# Patient Record
Sex: FEMALE | Race: WHITE | NOT HISPANIC OR LATINO
[De-identification: names, ages, dates, MRNs, and addresses within clinical notes are randomized per-mention and may not be internally consistent; named-entity substitution may affect disease eponyms.]

---

## 2017-06-16 ENCOUNTER — APPOINTMENT (OUTPATIENT)
Dept: MAMMOGRAPHY | Facility: IMAGING CENTER | Age: 67
End: 2017-06-16

## 2017-06-20 ENCOUNTER — APPOINTMENT (OUTPATIENT)
Dept: MAMMOGRAPHY | Facility: IMAGING CENTER | Age: 67
End: 2017-06-20

## 2017-06-20 ENCOUNTER — OUTPATIENT (OUTPATIENT)
Dept: OUTPATIENT SERVICES | Facility: HOSPITAL | Age: 67
LOS: 1 days | End: 2017-06-20
Payer: COMMERCIAL

## 2017-06-20 DIAGNOSIS — Z00.8 ENCOUNTER FOR OTHER GENERAL EXAMINATION: ICD-10-CM

## 2017-06-20 PROCEDURE — 77063 BREAST TOMOSYNTHESIS BI: CPT

## 2017-06-20 PROCEDURE — 77067 SCR MAMMO BI INCL CAD: CPT

## 2018-06-11 ENCOUNTER — APPOINTMENT (OUTPATIENT)
Dept: SURGERY | Facility: CLINIC | Age: 68
End: 2018-06-11
Payer: MEDICARE

## 2018-06-11 ENCOUNTER — LABORATORY RESULT (OUTPATIENT)
Age: 68
End: 2018-06-11

## 2018-06-11 VITALS
BODY MASS INDEX: 30.53 KG/M2 | HEIGHT: 66 IN | SYSTOLIC BLOOD PRESSURE: 145 MMHG | HEART RATE: 69 BPM | WEIGHT: 190 LBS | DIASTOLIC BLOOD PRESSURE: 83 MMHG

## 2018-06-11 DIAGNOSIS — Z80.3 FAMILY HISTORY OF MALIGNANT NEOPLASM OF BREAST: ICD-10-CM

## 2018-06-11 PROCEDURE — 76942 ECHO GUIDE FOR BIOPSY: CPT

## 2018-06-11 PROCEDURE — 99203 OFFICE O/P NEW LOW 30 MIN: CPT

## 2018-06-11 PROCEDURE — 10022: CPT

## 2018-06-11 RX ORDER — CYCLOSPORINE 0.5 MG/ML
0.05 EMULSION OPHTHALMIC
Refills: 0 | Status: ACTIVE | COMMUNITY

## 2018-06-18 PROBLEM — Z80.3 FAMILY HISTORY OF BREAST CANCER: Status: ACTIVE | Noted: 2018-06-18

## 2018-06-21 ENCOUNTER — OUTPATIENT (OUTPATIENT)
Dept: OUTPATIENT SERVICES | Facility: HOSPITAL | Age: 68
LOS: 1 days | End: 2018-06-21
Payer: MEDICARE

## 2018-06-21 ENCOUNTER — APPOINTMENT (OUTPATIENT)
Dept: MAMMOGRAPHY | Facility: IMAGING CENTER | Age: 68
End: 2018-06-21
Payer: MEDICARE

## 2018-06-21 DIAGNOSIS — Z00.8 ENCOUNTER FOR OTHER GENERAL EXAMINATION: ICD-10-CM

## 2018-06-21 PROCEDURE — 77067 SCR MAMMO BI INCL CAD: CPT

## 2018-06-21 PROCEDURE — 77063 BREAST TOMOSYNTHESIS BI: CPT

## 2018-06-21 PROCEDURE — 77067 SCR MAMMO BI INCL CAD: CPT | Mod: 26

## 2018-06-21 PROCEDURE — 77063 BREAST TOMOSYNTHESIS BI: CPT | Mod: 26

## 2018-06-28 ENCOUNTER — APPOINTMENT (OUTPATIENT)
Dept: ULTRASOUND IMAGING | Facility: IMAGING CENTER | Age: 68
End: 2018-06-28
Payer: MEDICARE

## 2018-06-28 ENCOUNTER — OUTPATIENT (OUTPATIENT)
Dept: OUTPATIENT SERVICES | Facility: HOSPITAL | Age: 68
LOS: 1 days | End: 2018-06-28
Payer: MEDICARE

## 2018-06-28 ENCOUNTER — APPOINTMENT (OUTPATIENT)
Dept: MAMMOGRAPHY | Facility: IMAGING CENTER | Age: 68
End: 2018-06-28
Payer: MEDICARE

## 2018-06-28 DIAGNOSIS — R92.2 INCONCLUSIVE MAMMOGRAM: ICD-10-CM

## 2018-06-28 PROCEDURE — G0279: CPT

## 2018-06-28 PROCEDURE — 76642 ULTRASOUND BREAST LIMITED: CPT

## 2018-06-28 PROCEDURE — 76642 ULTRASOUND BREAST LIMITED: CPT | Mod: 26,LT

## 2018-06-28 PROCEDURE — 77065 DX MAMMO INCL CAD UNI: CPT

## 2018-06-28 PROCEDURE — 77065 DX MAMMO INCL CAD UNI: CPT | Mod: 26,LT

## 2018-06-28 PROCEDURE — G0279: CPT | Mod: 26

## 2018-07-02 ENCOUNTER — RESULT REVIEW (OUTPATIENT)
Age: 68
End: 2018-07-02

## 2018-07-02 ENCOUNTER — OUTPATIENT (OUTPATIENT)
Dept: OUTPATIENT SERVICES | Facility: HOSPITAL | Age: 68
LOS: 1 days | End: 2018-07-02
Payer: MEDICARE

## 2018-07-02 ENCOUNTER — APPOINTMENT (OUTPATIENT)
Dept: ULTRASOUND IMAGING | Facility: IMAGING CENTER | Age: 68
End: 2018-07-02
Payer: MEDICARE

## 2018-07-02 DIAGNOSIS — R92.8 OTHER ABNORMAL AND INCONCLUSIVE FINDINGS ON DIAGNOSTIC IMAGING OF BREAST: ICD-10-CM

## 2018-07-02 PROCEDURE — 88360 TUMOR IMMUNOHISTOCHEM/MANUAL: CPT | Mod: 26

## 2018-07-02 PROCEDURE — 19083 BX BREAST 1ST LESION US IMAG: CPT

## 2018-07-02 PROCEDURE — 77065 DX MAMMO INCL CAD UNI: CPT

## 2018-07-02 PROCEDURE — 88305 TISSUE EXAM BY PATHOLOGIST: CPT | Mod: 26

## 2018-07-02 PROCEDURE — 19083 BX BREAST 1ST LESION US IMAG: CPT | Mod: LT

## 2018-07-02 PROCEDURE — 77065 DX MAMMO INCL CAD UNI: CPT | Mod: 26,LT

## 2018-07-02 PROCEDURE — 88305 TISSUE EXAM BY PATHOLOGIST: CPT

## 2018-07-02 PROCEDURE — A4648: CPT

## 2018-07-02 PROCEDURE — 88360 TUMOR IMMUNOHISTOCHEM/MANUAL: CPT

## 2018-07-09 ENCOUNTER — APPOINTMENT (OUTPATIENT)
Dept: SURGICAL ONCOLOGY | Facility: CLINIC | Age: 68
End: 2018-07-09
Payer: MEDICARE

## 2018-07-09 VITALS
HEIGHT: 66 IN | RESPIRATION RATE: 16 BRPM | DIASTOLIC BLOOD PRESSURE: 79 MMHG | BODY MASS INDEX: 30.53 KG/M2 | HEART RATE: 83 BPM | SYSTOLIC BLOOD PRESSURE: 129 MMHG | WEIGHT: 190 LBS | OXYGEN SATURATION: 98 %

## 2018-07-09 DIAGNOSIS — N28.1 CYST OF KIDNEY, ACQUIRED: ICD-10-CM

## 2018-07-09 DIAGNOSIS — K76.9 LIVER DISEASE, UNSPECIFIED: ICD-10-CM

## 2018-07-09 DIAGNOSIS — Z83.2 FAMILY HISTORY OF DISEASES OF THE BLOOD AND BLOOD-FORMING ORGANS AND CERTAIN DISORDERS INVOLVING THE IMMUNE MECHANISM: ICD-10-CM

## 2018-07-09 DIAGNOSIS — Z83.3 FAMILY HISTORY OF DIABETES MELLITUS: ICD-10-CM

## 2018-07-09 DIAGNOSIS — Z81.8 FAMILY HISTORY OF OTHER MENTAL AND BEHAVIORAL DISORDERS: ICD-10-CM

## 2018-07-09 DIAGNOSIS — Z82.49 FAMILY HISTORY OF ISCHEMIC HEART DISEASE AND OTHER DISEASES OF THE CIRCULATORY SYSTEM: ICD-10-CM

## 2018-07-09 DIAGNOSIS — Z82.3 FAMILY HISTORY OF STROKE: ICD-10-CM

## 2018-07-09 DIAGNOSIS — Z80.3 FAMILY HISTORY OF MALIGNANT NEOPLASM OF BREAST: ICD-10-CM

## 2018-07-09 PROCEDURE — 99205 OFFICE O/P NEW HI 60 MIN: CPT

## 2018-07-09 RX ORDER — OMEGA-3 FATTY ACIDS/FISH OIL 360-1200MG
1200 CAPSULE ORAL
Refills: 0 | Status: ACTIVE | COMMUNITY

## 2018-07-09 RX ORDER — ASPIRIN 81 MG
81 TABLET, DELAYED RELEASE (ENTERIC COATED) ORAL
Refills: 0 | Status: ACTIVE | COMMUNITY

## 2018-07-11 ENCOUNTER — APPOINTMENT (OUTPATIENT)
Dept: MRI IMAGING | Facility: IMAGING CENTER | Age: 68
End: 2018-07-11
Payer: MEDICARE

## 2018-07-11 ENCOUNTER — OUTPATIENT (OUTPATIENT)
Dept: OUTPATIENT SERVICES | Facility: HOSPITAL | Age: 68
LOS: 1 days | End: 2018-07-11
Payer: MEDICARE

## 2018-07-11 DIAGNOSIS — C50.919 MALIGNANT NEOPLASM OF UNSPECIFIED SITE OF UNSPECIFIED FEMALE BREAST: ICD-10-CM

## 2018-07-11 PROCEDURE — C8908: CPT

## 2018-07-11 PROCEDURE — 0159T: CPT | Mod: 26

## 2018-07-11 PROCEDURE — A9585: CPT

## 2018-07-11 PROCEDURE — 82565 ASSAY OF CREATININE: CPT

## 2018-07-11 PROCEDURE — C8937: CPT

## 2018-07-11 PROCEDURE — 77059 MRI BREAST BILATERAL: CPT | Mod: 26

## 2018-08-08 ENCOUNTER — FORM ENCOUNTER (OUTPATIENT)
Age: 68
End: 2018-08-08

## 2018-08-09 ENCOUNTER — APPOINTMENT (OUTPATIENT)
Dept: ULTRASOUND IMAGING | Facility: IMAGING CENTER | Age: 68
End: 2018-08-09
Payer: MEDICARE

## 2018-08-09 ENCOUNTER — OUTPATIENT (OUTPATIENT)
Dept: OUTPATIENT SERVICES | Facility: HOSPITAL | Age: 68
LOS: 1 days | End: 2018-08-09
Payer: MEDICARE

## 2018-08-09 DIAGNOSIS — R92.8 OTHER ABNORMAL AND INCONCLUSIVE FINDINGS ON DIAGNOSTIC IMAGING OF BREAST: ICD-10-CM

## 2018-08-09 PROCEDURE — 19285 PERQ DEV BREAST 1ST US IMAG: CPT | Mod: LT

## 2018-08-09 PROCEDURE — 19285 PERQ DEV BREAST 1ST US IMAG: CPT

## 2018-08-09 PROCEDURE — C1739: CPT

## 2018-08-14 ENCOUNTER — OUTPATIENT (OUTPATIENT)
Dept: OUTPATIENT SERVICES | Facility: HOSPITAL | Age: 68
LOS: 1 days | End: 2018-08-14
Payer: MEDICARE

## 2018-08-14 VITALS
WEIGHT: 190.04 LBS | HEART RATE: 75 BPM | OXYGEN SATURATION: 98 % | SYSTOLIC BLOOD PRESSURE: 137 MMHG | HEIGHT: 66 IN | RESPIRATION RATE: 16 BRPM | DIASTOLIC BLOOD PRESSURE: 88 MMHG | TEMPERATURE: 98 F

## 2018-08-14 DIAGNOSIS — C50.912 MALIGNANT NEOPLASM OF UNSPECIFIED SITE OF LEFT FEMALE BREAST: ICD-10-CM

## 2018-08-14 DIAGNOSIS — Z98.890 OTHER SPECIFIED POSTPROCEDURAL STATES: Chronic | ICD-10-CM

## 2018-08-14 DIAGNOSIS — E03.9 HYPOTHYROIDISM, UNSPECIFIED: ICD-10-CM

## 2018-08-14 DIAGNOSIS — R89.7 ABNORMAL HISTOLOGICAL FINDINGS IN SPECIMENS FROM OTHER ORGANS, SYSTEMS AND TISSUES: Chronic | ICD-10-CM

## 2018-08-14 DIAGNOSIS — Z01.818 ENCOUNTER FOR OTHER PREPROCEDURAL EXAMINATION: ICD-10-CM

## 2018-08-14 LAB
ALBUMIN SERPL ELPH-MCNC: 4 G/DL — SIGNIFICANT CHANGE UP (ref 3.3–5)
ALP SERPL-CCNC: 82 U/L — SIGNIFICANT CHANGE UP (ref 40–120)
ALT FLD-CCNC: 22 U/L — SIGNIFICANT CHANGE UP (ref 10–45)
ANION GAP SERPL CALC-SCNC: 12 MMOL/L — SIGNIFICANT CHANGE UP (ref 5–17)
AST SERPL-CCNC: 22 U/L — SIGNIFICANT CHANGE UP (ref 10–40)
BILIRUB SERPL-MCNC: 0.5 MG/DL — SIGNIFICANT CHANGE UP (ref 0.2–1.2)
BUN SERPL-MCNC: 14 MG/DL — SIGNIFICANT CHANGE UP (ref 7–23)
CALCIUM SERPL-MCNC: 9.8 MG/DL — SIGNIFICANT CHANGE UP (ref 8.4–10.5)
CHLORIDE SERPL-SCNC: 103 MMOL/L — SIGNIFICANT CHANGE UP (ref 96–108)
CO2 SERPL-SCNC: 27 MMOL/L — SIGNIFICANT CHANGE UP (ref 22–31)
CREAT SERPL-MCNC: 0.55 MG/DL — SIGNIFICANT CHANGE UP (ref 0.5–1.3)
GLUCOSE SERPL-MCNC: 79 MG/DL — SIGNIFICANT CHANGE UP (ref 70–99)
HCT VFR BLD CALC: 41.2 % — SIGNIFICANT CHANGE UP (ref 34.5–45)
HGB BLD-MCNC: 13.6 G/DL — SIGNIFICANT CHANGE UP (ref 11.5–15.5)
MCHC RBC-ENTMCNC: 30.5 PG — SIGNIFICANT CHANGE UP (ref 27–34)
MCHC RBC-ENTMCNC: 33 GM/DL — SIGNIFICANT CHANGE UP (ref 32–36)
MCV RBC AUTO: 92.4 FL — SIGNIFICANT CHANGE UP (ref 80–100)
PLATELET # BLD AUTO: 284 K/UL — SIGNIFICANT CHANGE UP (ref 150–400)
POTASSIUM SERPL-MCNC: 4.2 MMOL/L — SIGNIFICANT CHANGE UP (ref 3.5–5.3)
POTASSIUM SERPL-SCNC: 4.2 MMOL/L — SIGNIFICANT CHANGE UP (ref 3.5–5.3)
PROT SERPL-MCNC: 7.3 G/DL — SIGNIFICANT CHANGE UP (ref 6–8.3)
RBC # BLD: 4.46 M/UL — SIGNIFICANT CHANGE UP (ref 3.8–5.2)
RBC # FLD: 13.9 % — SIGNIFICANT CHANGE UP (ref 10.3–14.5)
SODIUM SERPL-SCNC: 142 MMOL/L — SIGNIFICANT CHANGE UP (ref 135–145)
WBC # BLD: 6.56 K/UL — SIGNIFICANT CHANGE UP (ref 3.8–10.5)
WBC # FLD AUTO: 6.56 K/UL — SIGNIFICANT CHANGE UP (ref 3.8–10.5)

## 2018-08-14 PROCEDURE — 85027 COMPLETE CBC AUTOMATED: CPT

## 2018-08-14 PROCEDURE — G0463: CPT

## 2018-08-14 PROCEDURE — 80053 COMPREHEN METABOLIC PANEL: CPT

## 2018-08-14 RX ORDER — SODIUM CHLORIDE 9 MG/ML
3 INJECTION INTRAMUSCULAR; INTRAVENOUS; SUBCUTANEOUS EVERY 8 HOURS
Qty: 0 | Refills: 0 | Status: DISCONTINUED | OUTPATIENT
Start: 2018-08-22 | End: 2018-09-06

## 2018-08-14 RX ORDER — LIDOCAINE HCL 20 MG/ML
0.2 VIAL (ML) INJECTION ONCE
Qty: 0 | Refills: 0 | Status: DISCONTINUED | OUTPATIENT
Start: 2018-08-22 | End: 2018-09-06

## 2018-08-14 NOTE — H&P PST ADULT - PROBLEM SELECTOR PLAN 1
Left breast saviscout localized lumpectomy, left sentinel lymph node biopsy  Check labs Left breast saviscout localized lumpectomy, left sentinel lymph node biopsy  Check labs  Pt reports Dr Marin advised her to stop ASA 1 week before SX

## 2018-08-14 NOTE — H&P PST ADULT - HISTORY OF PRESENT ILLNESS
68 Y/O Female 68 Y/O Female H/O Hypothyroid. Pt reports she went for routine Mammogram which revealed a lump left breast. S/P biopsy left breast. Presents for Left breast saviscout localized lumpectomy, left sentinel lymph node biopsy 8/22/18

## 2018-08-14 NOTE — H&P PST ADULT - PMH
Hypothyroid    Invasive lobular carcinoma of left breast in female    Lumbar disc disorder    Meniscus degeneration, left    Meniscus degeneration, right    Pneumonia  2015

## 2018-08-14 NOTE — H&P PST ADULT - PSH
Abnormal breast biopsy  left breast 7/2018  H/O cervical polypectomy    History of arthroscopy of both knees  2013 right knee meniscus repair, 6/2018 left knee meniscus repair  History of lumbar surgery  1980 , 2011

## 2018-08-14 NOTE — H&P PST ADULT - NSANTHOSAYNRD_GEN_A_CORE
No. AKASH screening performed.  STOP BANG Legend: 0-2 = LOW Risk; 3-4 = INTERMEDIATE Risk; 5-8 = HIGH Risk

## 2018-08-16 PROBLEM — E03.9 HYPOTHYROIDISM, UNSPECIFIED: Chronic | Status: ACTIVE | Noted: 2018-08-14

## 2018-08-16 PROBLEM — M23.307 OTHER MENISCUS DERANGEMENTS, UNSPECIFIED MENISCUS, LEFT KNEE: Chronic | Status: ACTIVE | Noted: 2018-08-14

## 2018-08-16 PROBLEM — M23.306 OTHER MENISCUS DERANGEMENTS, UNSPECIFIED MENISCUS, RIGHT KNEE: Chronic | Status: ACTIVE | Noted: 2018-08-14

## 2018-08-16 PROBLEM — C50.912 MALIGNANT NEOPLASM OF UNSPECIFIED SITE OF LEFT FEMALE BREAST: Chronic | Status: ACTIVE | Noted: 2018-08-14

## 2018-08-16 PROBLEM — M51.9 UNSPECIFIED THORACIC, THORACOLUMBAR AND LUMBOSACRAL INTERVERTEBRAL DISC DISORDER: Chronic | Status: ACTIVE | Noted: 2018-08-14

## 2018-08-16 PROBLEM — J18.9 PNEUMONIA, UNSPECIFIED ORGANISM: Chronic | Status: ACTIVE | Noted: 2018-08-14

## 2018-08-21 ENCOUNTER — FORM ENCOUNTER (OUTPATIENT)
Age: 68
End: 2018-08-21

## 2018-08-21 ENCOUNTER — TRANSCRIPTION ENCOUNTER (OUTPATIENT)
Age: 68
End: 2018-08-21

## 2018-08-22 ENCOUNTER — RESULT REVIEW (OUTPATIENT)
Age: 68
End: 2018-08-22

## 2018-08-22 ENCOUNTER — APPOINTMENT (OUTPATIENT)
Dept: SURGICAL ONCOLOGY | Facility: HOSPITAL | Age: 68
End: 2018-08-22

## 2018-08-22 ENCOUNTER — OUTPATIENT (OUTPATIENT)
Dept: OUTPATIENT SERVICES | Facility: HOSPITAL | Age: 68
LOS: 1 days | End: 2018-08-22
Payer: MEDICARE

## 2018-08-22 ENCOUNTER — APPOINTMENT (OUTPATIENT)
Dept: NUCLEAR MEDICINE | Facility: HOSPITAL | Age: 68
End: 2018-08-22

## 2018-08-22 VITALS
TEMPERATURE: 98 F | RESPIRATION RATE: 18 BRPM | DIASTOLIC BLOOD PRESSURE: 74 MMHG | WEIGHT: 190.04 LBS | OXYGEN SATURATION: 99 % | HEIGHT: 66 IN | HEART RATE: 78 BPM | SYSTOLIC BLOOD PRESSURE: 114 MMHG

## 2018-08-22 VITALS
HEART RATE: 70 BPM | DIASTOLIC BLOOD PRESSURE: 65 MMHG | OXYGEN SATURATION: 95 % | RESPIRATION RATE: 16 BRPM | SYSTOLIC BLOOD PRESSURE: 116 MMHG

## 2018-08-22 DIAGNOSIS — Z98.890 OTHER SPECIFIED POSTPROCEDURAL STATES: Chronic | ICD-10-CM

## 2018-08-22 DIAGNOSIS — R89.7 ABNORMAL HISTOLOGICAL FINDINGS IN SPECIMENS FROM OTHER ORGANS, SYSTEMS AND TISSUES: Chronic | ICD-10-CM

## 2018-08-22 DIAGNOSIS — C50.912 MALIGNANT NEOPLASM OF UNSPECIFIED SITE OF LEFT FEMALE BREAST: ICD-10-CM

## 2018-08-22 PROCEDURE — 88305 TISSUE EXAM BY PATHOLOGIST: CPT | Mod: 26

## 2018-08-22 PROCEDURE — 12032 INTMD RPR S/A/T/EXT 2.6-7.5: CPT | Mod: 59

## 2018-08-22 PROCEDURE — 19301 PARTIAL MASTECTOMY: CPT | Mod: LT

## 2018-08-22 PROCEDURE — 38308 INCISION OF LYMPH CHANNELS: CPT

## 2018-08-22 PROCEDURE — 88307 TISSUE EXAM BY PATHOLOGIST: CPT

## 2018-08-22 PROCEDURE — 19366: CPT

## 2018-08-22 PROCEDURE — A9541: CPT

## 2018-08-22 PROCEDURE — 38900 IO MAP OF SENT LYMPH NODE: CPT | Mod: LT

## 2018-08-22 PROCEDURE — 38792 RA TRACER ID OF SENTINL NODE: CPT | Mod: 59

## 2018-08-22 PROCEDURE — 88307 TISSUE EXAM BY PATHOLOGIST: CPT | Mod: 26

## 2018-08-22 PROCEDURE — 76098 X-RAY EXAM SURGICAL SPECIMEN: CPT | Mod: 26

## 2018-08-22 PROCEDURE — 88342 IMHCHEM/IMCYTCHM 1ST ANTB: CPT

## 2018-08-22 PROCEDURE — 88305 TISSUE EXAM BY PATHOLOGIST: CPT

## 2018-08-22 PROCEDURE — 76098 X-RAY EXAM SURGICAL SPECIMEN: CPT

## 2018-08-22 PROCEDURE — 88342 IMHCHEM/IMCYTCHM 1ST ANTB: CPT | Mod: 26

## 2018-08-22 PROCEDURE — 38790 INJECT FOR LYMPHATIC X-RAY: CPT | Mod: 59

## 2018-08-22 PROCEDURE — 19302 P-MASTECTOMY W/LN REMOVAL: CPT | Mod: LT

## 2018-08-22 PROCEDURE — 38525 BIOPSY/REMOVAL LYMPH NODES: CPT | Mod: LT

## 2018-08-22 PROCEDURE — 38900 IO MAP OF SENT LYMPH NODE: CPT

## 2018-08-22 RX ORDER — OXYCODONE HYDROCHLORIDE 5 MG/1
10 TABLET ORAL ONCE
Qty: 0 | Refills: 0 | Status: DISCONTINUED | OUTPATIENT
Start: 2018-08-22 | End: 2018-08-22

## 2018-08-22 RX ORDER — LEVOTHYROXINE SODIUM 125 MCG
1 TABLET ORAL
Qty: 0 | Refills: 0 | COMMUNITY

## 2018-08-22 RX ORDER — CYCLOSPORINE 0.5 MG/ML
1 EMULSION OPHTHALMIC
Qty: 0 | Refills: 0 | COMMUNITY

## 2018-08-22 RX ORDER — IBUPROFEN 200 MG
1 TABLET ORAL
Qty: 20 | Refills: 0 | OUTPATIENT
Start: 2018-08-22

## 2018-08-22 RX ORDER — OMEGA-3 ACID ETHYL ESTERS 1 G
1 CAPSULE ORAL
Qty: 0 | Refills: 0 | COMMUNITY

## 2018-08-22 RX ORDER — CHOLECALCIFEROL (VITAMIN D3) 125 MCG
1 CAPSULE ORAL
Qty: 0 | Refills: 0 | COMMUNITY

## 2018-08-22 RX ORDER — ONDANSETRON 8 MG/1
4 TABLET, FILM COATED ORAL ONCE
Qty: 0 | Refills: 0 | Status: DISCONTINUED | OUTPATIENT
Start: 2018-08-22 | End: 2018-09-06

## 2018-08-22 RX ORDER — ACETAMINOPHEN 500 MG
1000 TABLET ORAL ONCE
Qty: 0 | Refills: 0 | Status: COMPLETED | OUTPATIENT
Start: 2018-08-22 | End: 2018-08-22

## 2018-08-22 RX ORDER — OMEGA-3 ACID ETHYL ESTERS 1 G
0 CAPSULE ORAL
Qty: 0 | Refills: 0 | COMMUNITY

## 2018-08-22 RX ORDER — CEFAZOLIN SODIUM 1 G
2000 VIAL (EA) INJECTION ONCE
Qty: 0 | Refills: 0 | Status: COMPLETED | OUTPATIENT
Start: 2018-08-22 | End: 2018-08-22

## 2018-08-22 RX ORDER — ASPIRIN/CALCIUM CARB/MAGNESIUM 324 MG
1 TABLET ORAL
Qty: 0 | Refills: 0 | COMMUNITY

## 2018-08-22 NOTE — ASU DISCHARGE PLAN (ADULT/PEDIATRIC). - MEDICATION SUMMARY - MEDICATIONS TO TAKE
I will START or STAY ON the medications listed below when I get home from the hospital:    bergamot BPF herb 500mg oral daily  -- Indication: For Home med    aspirin 81 mg oral tablet  -- 1 tab(s) by mouth once a day 4x week  -- Indication: For Home med    Fish Oil 1200 mg oral capsule  -- 1 cap(s) by mouth once a day  -- Indication: For Home med    Fish Oil 1200 mg oral capsule  -- orally once a day  -- Indication: For Home med    Restasis 0.05% ophthalmic emulsion  -- 1 drop(s) to each affected eye every 12 hours  -- Indication: For Home med    Synthroid 112 mcg (0.112 mg) oral tablet  -- 1 tab(s) by mouth once a day alternating with .100mg  -- Indication: For Home med    Multiple Vitamins oral tablet  -- 1 tab(s) by mouth once a day  -- Indication: For Home med    Super B Complex oral tablet  -- 1 tab(s) by mouth once a day  -- Indication: For Home med    Vitamin D3 1000 intl units oral tablet  -- 1 tab(s) by mouth once a day  -- Indication: For Home med

## 2018-08-22 NOTE — BRIEF OPERATIVE NOTE - PROCEDURE
<<-----Click on this checkbox to enter Procedure Lumpectomy of left breast with axillary lymphadenectomy  08/22/2018    Active  DLIA

## 2018-08-22 NOTE — BRIEF OPERATIVE NOTE - POST-OP DX
Carcinoma of upper-outer quadrant of left female breast, unspecified estrogen receptor status  08/22/2018    Active  Umair Wilcox

## 2018-08-22 NOTE — BRIEF OPERATIVE NOTE - OPERATION/FINDINGS
Left axillary SLN identified and excised. Left breast carcinoma identified with hien  localization. Specimen sent for permanent with xray confirmation of  in specimen. Margins taken.

## 2018-08-22 NOTE — ASU DISCHARGE PLAN (ADULT/PEDIATRIC). - SPECIAL INSTRUCTIONS
WOUND CARE:  Please keep incisions clean and dry. Please do not Scrub or rub incisions. Do not use lotion or powder on incisions.   BATHING: Please do not submerge wound underwater. You may shower and/or sponge bathe.  ACTIVITY: No heavy lifting or straining until your follow up appointment. Otherwise, you may return to your usual level of physical activity. If you are taking narcotic pain medication (such as Percocet) DO NOT drive a car, operate machinery or make important decisions.  DIET: Return to your usual diet.  NOTIFY YOUR SURGEON IF: You have any bleeding that does not stop, any pus draining from your wound(s), any fever (over 100.4 F) or chills, persistent nausea/vomiting, persistent diarrhea, or if your pain is not controlled on your discharge pain medications.  FOLLOW-UP: Please follow-up with your surgeon, within 1-2 weeks following discharge- please call to schedule an appointment.

## 2018-08-22 NOTE — BRIEF OPERATIVE NOTE - SPECIMENS
Left axillary sentinel lymph node. Left breast cancer. 5 margins (superior, medial, inferior, lateral, deep)

## 2018-08-29 LAB — SURGICAL PATHOLOGY STUDY: SIGNIFICANT CHANGE UP

## 2018-09-10 ENCOUNTER — APPOINTMENT (OUTPATIENT)
Dept: SURGICAL ONCOLOGY | Facility: CLINIC | Age: 68
End: 2018-09-10
Payer: MEDICARE

## 2018-09-10 VITALS
OXYGEN SATURATION: 97 % | SYSTOLIC BLOOD PRESSURE: 136 MMHG | BODY MASS INDEX: 30.53 KG/M2 | DIASTOLIC BLOOD PRESSURE: 87 MMHG | HEIGHT: 66 IN | RESPIRATION RATE: 15 BRPM | HEART RATE: 80 BPM | WEIGHT: 190 LBS

## 2018-09-10 PROCEDURE — 99214 OFFICE O/P EST MOD 30 MIN: CPT | Mod: 24

## 2018-09-11 ENCOUNTER — OUTPATIENT (OUTPATIENT)
Dept: OUTPATIENT SERVICES | Facility: HOSPITAL | Age: 68
LOS: 1 days | Discharge: HOME | End: 2018-09-11
Payer: MEDICARE

## 2018-09-11 DIAGNOSIS — R89.7 ABNORMAL HISTOLOGICAL FINDINGS IN SPECIMENS FROM OTHER ORGANS, SYSTEMS AND TISSUES: Chronic | ICD-10-CM

## 2018-09-11 DIAGNOSIS — Z98.890 OTHER SPECIFIED POSTPROCEDURAL STATES: Chronic | ICD-10-CM

## 2018-09-17 NOTE — REASON FOR VISIT
[Consideration of Curative Therapy] : consideration of curative therapy for [Breast Cancer] : breast cancer

## 2018-09-18 ENCOUNTER — APPOINTMENT (OUTPATIENT)
Dept: RADIATION ONCOLOGY | Facility: CLINIC | Age: 68
End: 2018-09-18
Payer: MEDICARE

## 2018-09-18 VITALS
OXYGEN SATURATION: 98 % | WEIGHT: 191.69 LBS | HEART RATE: 70 BPM | SYSTOLIC BLOOD PRESSURE: 138 MMHG | HEIGHT: 66 IN | BODY MASS INDEX: 30.81 KG/M2 | DIASTOLIC BLOOD PRESSURE: 82 MMHG | RESPIRATION RATE: 18 BRPM

## 2018-09-18 PROCEDURE — 99204 OFFICE O/P NEW MOD 45 MIN: CPT | Mod: GC,25

## 2018-09-18 PROCEDURE — 77263 THER RADIOLOGY TX PLNG CPLX: CPT

## 2018-09-18 RX ORDER — MULTIVITAMIN
TABLET ORAL
Refills: 0 | Status: ACTIVE | COMMUNITY

## 2018-09-18 RX ORDER — LEVOTHYROXINE SODIUM 100 UG/1
100 TABLET ORAL
Refills: 0 | Status: ACTIVE | COMMUNITY

## 2018-09-18 RX ORDER — ASPIRIN 325 MG/1
325 TABLET, FILM COATED ORAL
Refills: 0 | Status: DISCONTINUED | COMMUNITY
End: 2018-09-18

## 2018-09-18 RX ORDER — VITAMIN B COMPLEX
TABLET ORAL
Refills: 0 | Status: ACTIVE | COMMUNITY

## 2018-09-18 NOTE — VITALS
[Maximal Pain Intensity: 0/10] : 0/10 [Least Pain Intensity: 0/10] : 0/10 [Pain Description/Quality: ___] : Pain description/quality: [unfilled] [NoTreatment Scheduled] : no treatment scheduled [90: Able to carry normal activity; minor signs or symptoms of disease.] : 90: Able to carry normal activity; minor signs or symptoms of disease.  [100: Fully active, normal.] : 100: Fully active, normal. [Date: ____________] : Patient's last distress assessment performed on [unfilled]. [5 - Distress Level] : Distress Level: 5

## 2018-09-20 NOTE — LETTER GREETING
[Dear  ___] : Dear  [unfilled], [Consult Letter:] : Your patient, [unfilled] was seen in my office today for consultation. [Please see my note below.] : Please see my note below.

## 2018-09-20 NOTE — REVIEW OF SYSTEMS
[Negative] : Neurological [Alopecia: Grade 0] : Alopecia: Grade 0 [Pruritus: Grade 0] : Pruritus: Grade 0 [Skin Atrophy: Grade 0] : Skin Atrophy: Grade 0 [Skin Hyperpigmentation: Grade 0] : Skin Hyperpigmentation: Grade 0 [Skin Induration: Grade 0] : Skin Induration: Grade 0 [Dermatitis Radiation: Grade 0] : Dermatitis Radiation: Grade 0

## 2018-09-25 PROCEDURE — 77333 RADIATION TREATMENT AID(S): CPT | Mod: 26

## 2018-09-25 PROCEDURE — 77290 THER RAD SIMULAJ FIELD CPLX: CPT | Mod: 26

## 2018-09-25 NOTE — OB/GYN HISTORY
[Definite:  ___ (Date)] : the last menstrual period was [unfilled] [Menopause Age: ____] : patient was [unfilled] years old at menopause [___] : Living: [unfilled] [History of Birth Control Pills] : Patient has no history of taking birth control pills [History of Hormone Replacement Therapy] : no history of hormone replacement therapy

## 2018-09-25 NOTE — LETTER CLOSING
[Consult Closing:] : Thank you for allowing me to participate in the care of this patient.  If you have any questions, please do not hesitate to contact me. [Sincerely yours,] : Sincerely yours, [FreeTextEntry3] : Yara Palencia MD\par \par

## 2018-09-25 NOTE — DATA REVIEWED
[FreeTextEntry1] : Surgical Final Report\par \par Final Diagnosis\par 1     Left axillary sentinel lymph node, Excision\par - One lymph node, negative for tumor ( Hand immunostain AE1-\par AE3)\par \par 2     Left breast cancer\par - Invasive lobular carcinoma, see  synoptic summary\par - The surgical margin is located 1.0 cm from the tumor in this\par specimen\par \par 3     Superior margin of left breast cancer\par - Benign breast tissue\par \par 4     Medial margin of left breast cancer\par - Benign breast tissue\par \par 5     Inferior margin of left breast cancer\par - Benign breast tissue\par \par 6     Lateral margin of left breast cancer\par - Benign breast tissue\par \par 7     Deep margin of left breast cancer\par - Benign breast tissue\par \par These immunohistochemical and in-situ hybridization tests have\par been performed, developed and their performance characteristics\par determined by [facility], Department of\par Pathology, Division of Immunopathology, [facility], [address] [place] [address]. It has not been\par cleared or approved by the U.S. Food and Drug Administration. The\par FDA has determined that such clearance or approval is not\par necessary. This test is used for clinical purposes. The\par laboratory is certified under the CLIA-88 as qualified to\par perform high complexity clinical testing.\par \par Specimen Identification:  Bresat\par Procedure:  Lumpectomy with separate margins\par Specimen Laterality:  Right\par \par \par \par Tumor Site:  Not specified\par Histologic Type of Invasive Carcinoma:  Invasive lobular\par carcinoma\par Tumor Size (Size of Largest Invasive Carcinoma):  8.0 mm (\par greastest dimenson) ( in correlation with prior biopsy)\par Histologic Grade: Comstock Histologic Score\par Glandular/Tubular Differentiation:  2\par Nuclear Pleomorphism:  2\par Mitotic Count:  1\par Overall Grade:  I ( well-differentiated)\par Tumor Focality:  Unifocal\par Ductal Carcinoma In Situ (DCIS):  not identified\par \par Lobular Carcinoma In Situ (LCIS):  not identified\par Tumor Extension:\par Skin:  Not received\par Nipple:  Not received\par Skeletal Muscle:  Not received\par Margins:  Negative for lesion\par Margin for invasive carcinoma:\par Margin uninvolved by invasive carcinoma\par \par Fragmented nature of the final margin specimens preclude\par assessment of the distance between tumor and nearest margin\par \par \par Margin for DCIS:  N/A\par \par Lymph Nodes\par Total number of lymph nodes (sentinel and non-\par sentinel) examined: 1\par Number of sentinel nodes examined:  1\par Lymph node involvement:  None.\par \par Method of Evaluation of [place] Lymph Nodes:  H\par and E (2 levels) and immunostains AE1-AE3\par \par Treatment Effect\par In the Breast:  No known presurgical therapy\par In the Lymph Nodes:  No known presurgical therapy\par Lymph-Vascular Invasion:  not identified\par Dermal Lymph-Vascular Invasion:  N/A\par Pathologic Staging:\par TNM Descriptors:  N/A\par Primary Tumor (Invasive Carcinoma) (pT):  pT1b\par Regional Lymph Nodes (pN):  pN0\par Distant Metastasis (M):  Not applicable.\par Additional Pathologic Findings:  in significant\par \par Ancillary Studies:  As per in-house  (Case #  [id number]):\par \par Estrogen receptor (ER) Positive: >98% moderate nuclear staining\par Progesterone receptor (PgR) Positive: >98% strong nuclear\par staining\par HER-2 Negative: 0+ membrane staining\par \par Note: Results cited above are for documentation purpose only. All\par clinical/therapeutic decisions must be made based on original\par report for biomarker study.\par \par Microcalcifications: not identified\par \par Clinical History\par left breast cancer\par \par Specimen(s) Submitted\par 1     Left axillary sentinel lymph node\par 2     Left breast cancer\par 3     Superior margin of left breast cancer\par 4     Medial margin of left breast cancer\par 5     Inferior margin of left breast cancer\par 6     Lateral margin of left breast cancer\par 7     Deep margin of left breast cancer\par \par Gross Description\par 1.  The specimen is received in formalin and the specimen\par container is labeled: Left axillary sentinel lymph node.  It\par consists of a 3.8 x 2.5 x 1.1 cm portion of fibrofatty tissue\par containing a single lymph node measuring 2.3 x 1.6 x 0.5 cm.  The\par lymph node is bisected and tan-pink, partially fatty cut surface.\par Lymph node entirely submitted in 2 cassettes.\par Immunohistochemistry protocol for sentinel lymph nodes is\par requested.\par \par 2.  The specimen is received fresh and the specimen container is\par labeled: Left breast cancer.  It consists of a 6.2 x 4.5 x 3.0\par cm, unoriented, fibrofatty specimen which lies on a grid.  A\par specimen radiograph is provided.  The  surgical margins/ external\par surface of the specimen is entirely inked black.  The specimen is\par sliced at 2.0-3.0 mm intervals.  The cut surface at the\par coordinate area E-F 8-9 shows a 1.2 x 0.8 x 0.7 cm hemorrhagic,\par ill-defined and firm area which is associated with a bowtie\par biopsy clip and a hien clip and is located 0.6 cm from the\par nearest inked margin.  The cut sections of remaining breast\par specimen show tan-yellow, focally hemorrhagic fibroadipose\par tissue.  Representative sections are submitted in 12 cassettes as\par follows: A-E = breast tissue at the coordinate area (B-C =\par \par \par \par \par \par JUDE FOWLER                5\par \par \par \par Surgical Final Report\par \par \par \par \par bisected contiguous slice; D-E = bisected contiguous slice to\par include the area associated with the biopsy clip in Cassette D);\par F-I = breast tissue adjacent to the coordinate area (F-G =\par bisected contiguous slice; H-I = bisected contiguous slice); J-L\par = random sections.\par \par TIME OF COLLECTION: 8/22/18 9:51\par CUT SURFACE EXPOSED TO FORMALIN:  8/22/18 12:14\par ISCHEMIC TIME:  2 HOURS 23 MIN\par TOTAL FIXATION TIME IN FORMALIN: 29 HOURS 46 MIN\par \par Note: The breast specimen(s) processed meet the fixation time\par standards of 6-72 hrs., which have been set by the American\par Society of Clinical Oncology (ASCO) and the College of American\par Pathologists (CAP), to ensure appropriate tissue quality for\par effective ER/DE and Her2 receptor testing.\par \par 3.  The specimen is received in formalin and the specimen\par container is labeled: Superior margin of left breast cancer.  It\par consists of a non-oriented piece of tan-yellow and focally\par hemorrhagic fibroadipose tissue measuring 1.7 x 1.5 x 0.3 cm\par which is submitted in toto in one cassette\par \par 4.  The specimen is received in formalin and the specimen\par container is labeled: Medial margin of left breast.  It consists\par of a non-oriented piece of tan-yellow fibroadipose tissue\par measuring 1.2 x 0.9 x 0.2 cm which is submitted in toto in one\par cassette.\par \par 5.  The specimen is received in formalin and the specimen\par container is labeled: Inferior margin of left breast cancer.  It\par consists of a non-oriented piece of tan-yellow fibroadipose\par tissue measuring 1.2 x 0.7 x 0.3 cm which is submitted in toto in\par one cassette.\par \par 6.  The specimen is received in formalin and the specimen\par container is labeled: Lateral margin of left breast cancer.  It\par consists of a non-oriented piece of tan-yellow and focally\par hemorrhagic fibroadipose tissue measuring 1.5 x 1.2 x 0.3 cm\par which is submitted in toto in one cassette.\par \par 7.  The specimen is received in formalin and the specimen\par container is labeled: Deep margin of left breast cancer.  It\par consists of a non-oriented piece of tan-yellow fibroadipose\par tissue measuring 1.5 x 1.1 x 0.3 cm which is submitted in toto in\par one cassette.\par \par

## 2018-09-25 NOTE — PHYSICAL EXAM
[Sclera] : the sclera and conjunctiva were normal [Outer Ear] : the ears and nose were normal in appearance [Abdomen Soft] : soft [Nondistended] : nondistended [Supraclavicular Lymph Nodes Enlarged Bilaterally] : supraclavicular [Axillary Lymph Nodes Enlarged Bilaterally] : axillary [No UE Edema] : there is no upper extremity edema [Normal] : no focal deficits [de-identified] : left lumpectomy (2:00 1 cm from the areola) and axillary scar well healed. She has minimal edema in the left breast and mild erythema inferior to the lumpectomy. Mild firmness along the lumpectomy scar. Non tender to palpation. No nipple discharge [de-identified] : grossly intact

## 2018-09-25 NOTE — HISTORY OF PRESENT ILLNESS
[FreeTextEntry1] : Ms. Martinez is a 68 year old post menopausal female with Stage I  pT1bN0 well differentiated  8 mm invasive lobular carcinoma of the left breast, ER/DE(+) HER2(-), negative margins, negative lymph nodes  s/p left SABINE  lumpectomy and left axillary sentinel lymph node biopsy on 8/22/18. She presents today for consideration of adjuvant RT.\par \par Oncologic history:\par She underwent a screening mammogram on 6/21/18 which revealed asymmetries in the upper outer quadrant, middle third depth of the LEFT breast. No dominant mass, suspicious grouping of calcifications or other sign of malignancy was identified. BIRADS 0\par \par Diagnostic left mammogram/left ultrasound 6/28/18 showed focal asymmetry in the upper outer left breast with associated architectural distortion measuring approximately 9-10 mm. 10 mm hypoechoic mass in the left breast 2:00 axis with mammographic correlate. US guided biopsy is recommended. BIRADS 4C. \par \par She had US guided core needle biopsy of the left breast with post procedure mammogram on 7/2/18. Pathology report revealed invasive lobular carcinoma, classic type, Sukhdev score 5/9, with invasive tumor measuring at least 8 mm. There was no lymphovascular invasion seen. ER and DE were both greater than 98%. HER-2/kat was negative.\par \par Breast MRI on 7/11/18 showed an enhancing mass measuring 1.5 x 1.3 cm in the upper outer quadrant of the left breast at the 2:00 axis at the middle third depth. There were no other suspicious findings.\par \par She underwent left lumpectomy and left axillary sentinel lymph node biopsy on 8/22/18 with negative margins, negative sentinel lymph nodes. Pathology showed invasive lobular carcinoma, 8mm, well differentiated with Sukhdev score 5/9, ER(+), DE(+), HER2(-). No LVI\par \par She feels well today. Initially, she had pain in the left axilla which resolved. Denies any pain near the lumpectomy site. She still has left nipple sensitivity. Denies fever, chills, or redness at the incision site. She has a history of benign thyroid nodules and two back surgeries for her spine. She is able to lie on her back.

## 2018-09-28 PROCEDURE — 77334 RADIATION TREATMENT AID(S): CPT | Mod: 26

## 2018-09-28 PROCEDURE — 77300 RADIATION THERAPY DOSE PLAN: CPT | Mod: 26

## 2018-09-28 PROCEDURE — 77295 3-D RADIOTHERAPY PLAN: CPT | Mod: 26

## 2018-10-05 PROCEDURE — 77280 THER RAD SIMULAJ FIELD SMPL: CPT | Mod: 26

## 2018-10-08 PROCEDURE — 77427 RADIATION TX MANAGEMENT X5: CPT

## 2018-10-11 NOTE — DISEASE MANAGEMENT
[Pathological] : TNM Stage: p [IA] : IA [TTNM] : 1b [NTNM] : 0 [MTNM] : x [de-identified] : [id number] [de-identified] : [id number] [de-identified] : left breast

## 2018-10-11 NOTE — PHYSICAL EXAM
[Normal] : well developed, well nourished, in no acute distress [de-identified] : lumpectomy site well healed. No erythema, tenderness or drainage

## 2018-10-11 NOTE — HISTORY OF PRESENT ILLNESS
[FreeTextEntry1] : Ms. Martinez is a 68 year old female with Stage I [id number] well differentiated invasive lobular carcinoma of the left breast, ER/UT(+) HER2(-). She underwent lumpectomy with negative margins and had a negative sentinel lymph node biopsy. She is currently receiving adjuvant radiation therapy.\par \par She is feeling generally well. She denies fatigue and pain. She has not noted any skin reactions. She is using Aquaphor on the skin.  She is in the middle of moving to a new apartment and is busy with the move.

## 2018-10-15 PROCEDURE — 77427 RADIATION TX MANAGEMENT X5: CPT

## 2018-10-18 NOTE — HISTORY OF PRESENT ILLNESS
[FreeTextEntry1] : Ms. Martinez is a 68 year old female with Stage I [id number] well differentiated invasive lobular carcinoma of the left breast, ER/DC(+) HER2(-). She underwent lumpectomy with negative margins and had a negative sentinel lymph node biopsy. She is currently receiving adjuvant radiation therapy.\par \par She is feeling generally well, except that today, she is feeling a little nauseous, for the first time during treatment. She is the process of moving, so this has been an added stress. She has notices a mild redness on her breast she is using Aquaphor.

## 2018-10-18 NOTE — REVIEW OF SYSTEMS
[Alopecia: Grade 0] : Alopecia: Grade 0 [Pruritus: Grade 0] : Pruritus: Grade 0 [Skin Atrophy: Grade 0] : Skin Atrophy: Grade 0 [Skin Hyperpigmentation: Grade 1 - Hyperpigmentation covering <10% BSA; no psychosocial impact] : Skin Hyperpigmentation: Grade 1 - Hyperpigmentation covering <10% BSA; no psychosocial impact [Dermatitis Radiation: Grade 1 - Faint erythema or dry desquamation] : Dermatitis Radiation: Grade 1 - Faint erythema or dry desquamation [Skin Induration: Grade 0] : Skin Induration: Grade 0

## 2018-10-18 NOTE — PHYSICAL EXAM
[Normal] : well developed, well nourished, in no acute distress [de-identified] : lumpectomy site well healed, mild erythema

## 2018-10-18 NOTE — DISEASE MANAGEMENT
[Pathological] : TNM Stage: p [IA] : IA [TTNM] : 1b [NTNM] : 0 [MTNM] : x [de-identified] : [id number] [de-identified] : [id number] [de-identified] : left breast

## 2018-10-22 PROCEDURE — 77427 RADIATION TX MANAGEMENT X5: CPT

## 2018-10-25 NOTE — HISTORY OF PRESENT ILLNESS
[FreeTextEntry1] : Ms. Martinez is a 68 year old female with Stage I [id number] well differentiated invasive lobular carcinoma of the left breast, ER/[place](+) HER2(-). She underwent lumpectomy with negative margins and had a negative sentinel lymph node biopsy. She is currently receiving adjuvant radiation therapy.\par \par She is feeling generally well.  She has noticed a mild redness on her breast and she is using Aquaphor. She was prescribed Zofran 4mg last week by PA, and this is helping. She is taking it before the treatment. She has a little constipation this week.

## 2018-10-25 NOTE — PHYSICAL EXAM
[Normal] : well developed, well nourished, in no acute distress [de-identified] : lumpectomy site well healed, mild erythema and edema

## 2018-10-25 NOTE — VITALS
[Maximal Pain Intensity: 0/10] : 0/10 [Least Pain Intensity: 0/10] : 0/10 [ECOG Performance Status: 0 - Fully active, able to carry on all pre-disease performance without restriction] : Performance Status: 0 - Fully active, able to carry on all pre-disease performance without restriction [80: Normal activity with effort; some signs or symptoms of disease.] : 80: Normal activity with effort; some signs or symptoms of disease.

## 2018-10-25 NOTE — DISEASE MANAGEMENT
[Pathological] : TNM Stage: p [IA] : IA [TTNM] : 1b [NTNM] : 0 [MTNM] : x [de-identified] : [id number] [de-identified] : 3813aGy [de-identified] : left breast

## 2018-10-29 ENCOUNTER — CHART COPY (OUTPATIENT)
Age: 68
End: 2018-10-29

## 2018-10-29 ENCOUNTER — RX RENEWAL (OUTPATIENT)
Age: 68
End: 2018-10-29

## 2018-11-09 ENCOUNTER — MESSAGE (OUTPATIENT)
Age: 68
End: 2018-11-09

## 2018-11-09 ENCOUNTER — RX CHANGE (OUTPATIENT)
Age: 68
End: 2018-11-09

## 2018-12-05 ENCOUNTER — APPOINTMENT (OUTPATIENT)
Dept: RADIATION ONCOLOGY | Facility: CLINIC | Age: 68
End: 2018-12-05
Payer: MEDICARE

## 2018-12-05 VITALS
DIASTOLIC BLOOD PRESSURE: 77 MMHG | SYSTOLIC BLOOD PRESSURE: 127 MMHG | BODY MASS INDEX: 30.53 KG/M2 | OXYGEN SATURATION: 100 % | RESPIRATION RATE: 16 BRPM | WEIGHT: 190 LBS | HEIGHT: 66 IN | HEART RATE: 73 BPM

## 2018-12-05 PROCEDURE — 99024 POSTOP FOLLOW-UP VISIT: CPT

## 2018-12-05 RX ORDER — SILVER SULFADIAZINE 10 MG/G
1 CREAM TOPICAL TWICE DAILY
Qty: 1 | Refills: 1 | Status: DISCONTINUED | COMMUNITY
Start: 2018-11-09 | End: 2018-12-05

## 2018-12-05 NOTE — VITALS
[Maximal Pain Intensity: 3/10] : 3/10 [Least Pain Intensity: 0/10] : 0/10 [Pain Description/Quality: ___] : Pain description/quality: [unfilled] [Pain Duration: ___] : Pain duration: [unfilled] [Pain Location: ___] : Pain Location: [unfilled] [Pain Interferes with ADLs] : Pain interferes with activities of daily living. [NSAID/Non-Opioid] : NSAID/Non-Opioid [80: Normal activity with effort; some signs or symptoms of disease.] : 80: Normal activity with effort; some signs or symptoms of disease.  [ECOG Performance Status: 1 - Restricted in physically strenuous activity but ambulatory and able to carry out work of a light or sedentary nature] : Performance Status: 1 - Restricted in physically strenuous activity but ambulatory and able to carry out work of a light or sedentary nature, e.g., light house work, office work

## 2018-12-05 NOTE — HISTORY OF PRESENT ILLNESS
[FreeTextEntry1] : Ms. Martinez is a 68 year old female with Stage I [id number] well differentiated invasive lobular carcinoma of the left breast, ER/UT(+) HER2(-). She underwent lumpectomy with negative margins and had a negative sentinel lymph node biopsy. She underwent adjuvant radiation therapy, a dose of 4240 cGy to the left breast completed on 10/29/18. She presents today for post treatment evaluation. \par \par The patient reports feeling generally well, except that she developed a frozen right shoulder. The shoulder is very painful and her ROM is limited. She started going to physical therapy and has not seen any improvement yet. She denies cough or shortness of breath.  There has been some improvement in the skin since completing radiation therapy. The skin reaction became worse for about two weeks after the RT, but now it is better.  She continues regular skin care with Aquaphor. She takes Advil for the shoulder pain. She started anastrozole and is tolerating it well. She denies joint pains other than the right shoulder.

## 2018-12-05 NOTE — PHYSICAL EXAM
[Breast Abnormal Lactation (Galactorrhea)] : no nipple discharge [No UE Edema] : there is no upper extremity edema [Normal] : no palpable adenopathy [Supraclavicular Lymph Nodes Enlarged Bilaterally] : supraclavicular [Axillary Lymph Nodes Enlarged Bilaterally] : axillary [de-identified] : left breast with well heaeled surgical scars, areas of residual hyperpigmentation, no significant edema [de-identified] : right shoulder limited ROM, no edema

## 2018-12-05 NOTE — REVIEW OF SYSTEMS
[Joint Pain] : joint pain [Negative] : Allergic/Immunologic [Alopecia: Grade 0] : Alopecia: Grade 0 [Pruritus: Grade 0] : Pruritus: Grade 0 [Skin Atrophy: Grade 0] : Skin Atrophy: Grade 0 [Skin Hyperpigmentation: Grade 1 - Hyperpigmentation covering <10% BSA; no psychosocial impact] : Skin Hyperpigmentation: Grade 1 - Hyperpigmentation covering <10% BSA; no psychosocial impact [Skin Induration: Grade 0] : Skin Induration: Grade 0 [Dermatitis Radiation: Grade 0] : Dermatitis Radiation: Grade 0 [FreeTextEntry9] : R shoulder

## 2018-12-10 ENCOUNTER — APPOINTMENT (OUTPATIENT)
Dept: SURGICAL ONCOLOGY | Facility: CLINIC | Age: 68
End: 2018-12-10
Payer: MEDICARE

## 2018-12-10 VITALS
HEART RATE: 89 BPM | HEIGHT: 66 IN | BODY MASS INDEX: 30.53 KG/M2 | SYSTOLIC BLOOD PRESSURE: 123 MMHG | RESPIRATION RATE: 51 BRPM | WEIGHT: 190 LBS | DIASTOLIC BLOOD PRESSURE: 72 MMHG

## 2018-12-10 PROCEDURE — 99214 OFFICE O/P EST MOD 30 MIN: CPT

## 2018-12-17 ENCOUNTER — LABORATORY RESULT (OUTPATIENT)
Age: 68
End: 2018-12-17

## 2018-12-17 ENCOUNTER — APPOINTMENT (OUTPATIENT)
Dept: SURGERY | Facility: CLINIC | Age: 68
End: 2018-12-17
Payer: MEDICARE

## 2018-12-17 PROCEDURE — 76942 ECHO GUIDE FOR BIOPSY: CPT

## 2018-12-17 PROCEDURE — 99214 OFFICE O/P EST MOD 30 MIN: CPT

## 2018-12-17 PROCEDURE — 10022: CPT

## 2019-02-22 ENCOUNTER — APPOINTMENT (OUTPATIENT)
Dept: SURGICAL ONCOLOGY | Facility: CLINIC | Age: 69
End: 2019-02-22
Payer: MEDICARE

## 2019-02-22 VITALS
HEART RATE: 91 BPM | HEIGHT: 66 IN | WEIGHT: 190 LBS | DIASTOLIC BLOOD PRESSURE: 82 MMHG | TEMPERATURE: 97.7 F | RESPIRATION RATE: 18 BRPM | BODY MASS INDEX: 30.53 KG/M2 | SYSTOLIC BLOOD PRESSURE: 133 MMHG

## 2019-02-22 PROCEDURE — 99214 OFFICE O/P EST MOD 30 MIN: CPT

## 2019-02-22 NOTE — HISTORY OF PRESENT ILLNESS
[de-identified] : This is a 68 year old post menopausal female presents for a 3 month breast cancer follow up visit. She is s/p left SABINE  lumpectomy and left axillary sentinel lymph node biopsy on 18.  Final pathology revealed an 8 mm invasive lobular carcinoma, ER/AK(+) HER2(-), negative margins, negative lymph nodes. Tumor stage: pT1b pN0. \par \par Oncotype Dx=15\par \par She completed XRT under the care of Dr. Yara Palencia.  She is currently on Arimidex 1 mg daily. \par \par Labs 18: CEA (0.6 ng/mL); CA27.29 (17.8 U/mL); LFT's WNL\par \par \par Denies palpable breast masses, nipple discharge, skin dimpling or inversion. She states she developed Left lower breast discomfort last week and over the past few days the breast has been very itchy and darkening in color. She continues to apply Silvadene as per Dr. aPlencia.  States she has been experiencing right shoulder pain and continues PT with slight improvement. \par \par PERTINENT HISTORY:\par Initially consulted 18 for newly diagnosed left breast cancer. She was referred to our office by Dr. Waldemar Carlos (OB-GYN). \par \par She underwent a screening mammogram on 18 which revealed asymmetries in the upper outer quadrant, middle third depth of the LEFT breast. No dominant mass, suspicious grouping of calcifications or other sign of malignancy was identified. BIRADS 0\par \par Diagnostic left mammogram/left ultrasound 18 showed focal asymmetry in the upper outer left breast with associated architectural distortion measuring approximately 9-10 mm.  10 mm hypoechoic mass in the left breast 2:00 axis with mammographic correlate. US guided biopsy is recommended. BIRADS 4C. \par \par She is s/p US guided core needle biopsy of the left breast with post procedure mammogram on 18.  \par \par Pathology:\par -Invasive lobular carcinoma, classic type\par -[place] score 5/9\par -Lymphovascular permeation by tumor not seen\par -ER(+), [place](+), HER2(-)\par \par Denies palpable breast masses, nipple discharge, skin dimpling, inversion or breast pain\par Family history of breast cancer involves her paternal aunt at age 50 and maternal cousin @ age 61\par Denies family history of ovarian cancer\par Denies prior breast biopsies\par Denies past or current use of exogenous HRT\par \par Menarche age: 15\par LMP: \par    (age at first pregnancy 26)\par \par PMH notable for hypothyroidism (on Synthroid), left thyroid nodule, post infectious neuritis, liver lesion?, left kidney cyst, left ovarian cyst, 3 vaginal births, spinal surgery , uterine polyp  s/p D&C, spine surgery , right knee meniscus repair , left knee meniscus repair . Former smoker (quit ). Social alcohol use. \par \par Referring physician/OB-GYN: Dr. Waldemar Villarreal\par Internist: Dr. Manda Chacon\par

## 2019-02-22 NOTE — PHYSICAL EXAM
[Normal] : supple, no neck mass and thyroid not enlarged [Normal Supraclavicular Lymph Nodes] : normal supraclavicular lymph nodes [Normal Axillary Lymph Nodes] : normal axillary lymph nodes [Normal] : oriented to person, place and time, with appropriate affect [de-identified] : healed left breast incision; no masses or nipple discharge bilaterally ; post radiation hyperpigmentation to the left breast [de-identified] : frozen right shoulder

## 2019-02-22 NOTE — ASSESSMENT
[FreeTextEntry1] : IMP:\par BRANDO- Left breast Invasive lobular carcinoma, classic type, ER(+), [place](+), HER2(-) \par S/p left SABINE  lumpectomy and left axillary sentinel lymph node biopsy on 8/22/18. \par Final path: 8 mm invasive lobular carcinoma, ER/DC(+) HER2(-), negative margins, negative lymph nodes\par Tumor stage: pT1b pN0\par Completed XRT; On Arimidex 1 mg daily\par B/L shoulder pain - continue PT\par Left breast pruritus \par \par PLAN:\par Blood work 3/2019 (script in computer)\par B/L mammogram/sonogram 6/2019 (script in computer)\par RTO 3 months

## 2019-02-22 NOTE — REASON FOR VISIT
[Follow-Up Visit] : a follow-up visit for [Breast Cancer] : breast cancer [Other: _____] : [unfilled] [FreeTextEntry2] : s/p left lumpectomy and SLNB 8/22/18

## 2019-02-27 ENCOUNTER — APPOINTMENT (OUTPATIENT)
Dept: RADIATION ONCOLOGY | Facility: CLINIC | Age: 69
End: 2019-02-27
Payer: MEDICARE

## 2019-02-27 VITALS
SYSTOLIC BLOOD PRESSURE: 138 MMHG | BODY MASS INDEX: 30.67 KG/M2 | RESPIRATION RATE: 18 BRPM | OXYGEN SATURATION: 98 % | WEIGHT: 190 LBS | HEART RATE: 79 BPM | DIASTOLIC BLOOD PRESSURE: 85 MMHG

## 2019-02-27 DIAGNOSIS — L23.9 ALLERGIC CONTACT DERMATITIS, UNSPECIFIED CAUSE: ICD-10-CM

## 2019-02-27 PROCEDURE — 99213 OFFICE O/P EST LOW 20 MIN: CPT

## 2019-02-27 RX ORDER — BROMPHENIRAMINE MALEATE, PSEUDOEPHEDRINE HYDROCHLORIDE, 2; 30; 10 MG/5ML; MG/5ML; MG/5ML
30-2-10 SYRUP ORAL
Qty: 200 | Refills: 0 | Status: DISCONTINUED | COMMUNITY
Start: 2019-01-02

## 2019-02-27 RX ORDER — FLUTICASONE PROPIONATE 50 UG/1
50 SPRAY, METERED NASAL
Qty: 16 | Refills: 0 | Status: DISCONTINUED | COMMUNITY
Start: 2019-01-01

## 2019-02-27 RX ORDER — ONDANSETRON 4 MG/1
4 TABLET ORAL TWICE DAILY
Qty: 10 | Refills: 0 | Status: DISCONTINUED | COMMUNITY
Start: 2018-10-19 | End: 2019-02-27

## 2019-03-04 PROBLEM — L23.9 ALLERGIC CONTACT DERMATITIS, UNSPECIFIED TRIGGER: Status: ACTIVE | Noted: 2019-03-04

## 2019-03-04 NOTE — REVIEW OF SYSTEMS
[Night Sweats] : night sweats [Constipation] : constipation [Skin Rash] : skin rash [Negative] : Allergic/Immunologic [Pruritus: Grade 2 - Intense or widespread; intermittent; skin changes from scratching (e.g., edema, papulation, excoriations, lichenification, oozing/crusts); oral intervention indicated; limiting instrumental ADL] : Pruritus: Grade 2 - Intense or widespread; intermittent; skin changes from scratching (e.g., edema, papulation, excoriations, lichenification, oozing/crusts); oral intervention indicated; limiting instrumental ADL [Dermatitis Radiation: Grade 0] : Dermatitis Radiation: Grade 0 [Alopecia: Grade 0] : Alopecia: Grade 0 [Skin Atrophy: Grade 0] : Skin Atrophy: Grade 0 [Skin Hyperpigmentation: Grade 0] : Skin Hyperpigmentation: Grade 0 [Skin Induration: Grade 1 - Mild induration, able to move skin parallel to plane (sliding) and perpendicular to skin (pinching up)] : Skin Induration: Grade 1 - Mild induration, able to move skin parallel to plane (sliding) and perpendicular to skin (pinching up)

## 2019-03-04 NOTE — PHYSICAL EXAM
[] : no respiratory distress [Heart Rate And Rhythm] : heart rate and rhythm were normal [Breast Abnormal Lactation (Galactorrhea)] : no nipple discharge [No UE Edema] : there is no upper extremity edema [Supraclavicular Lymph Nodes Enlarged Bilaterally] : supraclavicular [Axillary Lymph Nodes Enlarged Bilaterally] : axillary [Normal] : no joint swelling, no clubbing or cyanosis of the fingernails and muscle strength and tone were normal [de-identified] : left breast skin with diffuse wheal and flare type skin reaction within radiation therapy portals, mild residual hyperpigmentation, no skin lesions or soft tissue masses

## 2019-03-04 NOTE — HISTORY OF PRESENT ILLNESS
[FreeTextEntry1] : Ms. Martinez is a 68 year old female with Stage I [id number] well differentiated invasive lobular carcinoma of the left breast, ER/VT(+) HER2(-). She underwent lumpectomy with negative margins and had a negative sentinel lymph node biopsy. She underwent adjuvant radiation therapy, a dose of 4240 cGy to the left breast completed on 10/29/18.\par \par She reports to be doing very well until last week when she developed left lower breast discomfort and noticed that her breast was getting very red. She has been applying Silvadene cream and hydrocortisone 2.5% which have not been helping. She was evaluated by Dr. Marin who suggested she get evaluated here. \par \par The skin of the right breast has been pruritic, not painful. She is otherwise feeling well and denies feeling ill in other ways. She cannot recall anything that may have irritated the skin, such as a topical cream or detergent. She has not started any new medications. She continues on AI without significant side effects.

## 2019-03-04 NOTE — VITALS
[Maximal Pain Intensity: 0/10] : 0/10 [90: Able to carry normal activity; minor signs or symptoms of disease.] : 90: Able to carry normal activity; minor signs or symptoms of disease.  [Least Pain Intensity: 0/10] : 0/10 [ECOG Performance Status: 0 - Fully active, able to carry on all pre-disease performance without restriction] : Performance Status: 0 - Fully active, able to carry on all pre-disease performance without restriction

## 2019-03-13 LAB
ALBUMIN SERPL ELPH-MCNC: 3.9 G/DL
ALP BLD-CCNC: 87 U/L
ALT SERPL-CCNC: 22 U/L
AST SERPL-CCNC: 22 U/L
BILIRUB DIRECT SERPL-MCNC: 0.1 MG/DL
BILIRUB INDIRECT SERPL-MCNC: 0.4 MG/DL
BILIRUB SERPL-MCNC: 0.5 MG/DL
CANCER AG27-29 SERPL-ACNC: 19.2 U/ML
CEA SERPL-MCNC: <0.6 NG/ML
PROT SERPL-MCNC: 6.6 G/DL

## 2019-03-18 ENCOUNTER — APPOINTMENT (OUTPATIENT)
Dept: SURGICAL ONCOLOGY | Facility: CLINIC | Age: 69
End: 2019-03-18

## 2019-05-08 ENCOUNTER — RX RENEWAL (OUTPATIENT)
Age: 69
End: 2019-05-08

## 2019-05-13 LAB
ALBUMIN SERPL ELPH-MCNC: 4.2 G/DL
ALP BLD-CCNC: 97 U/L
ALT SERPL-CCNC: 24 U/L
AST SERPL-CCNC: 21 U/L
BILIRUB DIRECT SERPL-MCNC: <0.1 MG/DL
BILIRUB INDIRECT SERPL-MCNC: >0.4 MG/DL
BILIRUB SERPL-MCNC: 0.5 MG/DL
CEA SERPL-MCNC: <0.6 NG/ML
PROT SERPL-MCNC: 7 G/DL

## 2019-05-14 LAB — CANCER AG27-29 SERPL-ACNC: 19.5 U/ML

## 2019-05-20 ENCOUNTER — APPOINTMENT (OUTPATIENT)
Dept: SURGICAL ONCOLOGY | Facility: CLINIC | Age: 69
End: 2019-05-20
Payer: MEDICARE

## 2019-05-20 VITALS
WEIGHT: 190 LBS | TEMPERATURE: 97.8 F | HEART RATE: 80 BPM | DIASTOLIC BLOOD PRESSURE: 73 MMHG | RESPIRATION RATE: 18 BRPM | HEIGHT: 66 IN | BODY MASS INDEX: 30.53 KG/M2 | SYSTOLIC BLOOD PRESSURE: 128 MMHG | OXYGEN SATURATION: 97 %

## 2019-05-20 PROCEDURE — 99214 OFFICE O/P EST MOD 30 MIN: CPT

## 2019-05-20 NOTE — PHYSICAL EXAM
[Normal] : supple, no neck mass and thyroid not enlarged [Normal Supraclavicular Lymph Nodes] : normal supraclavicular lymph nodes [Normal Axillary Lymph Nodes] : normal axillary lymph nodes [Normal] : oriented to person, place and time, with appropriate affect [de-identified] : healed left breast incision; no masses or nipple discharge bilaterally ; post radiation hyperpigmentation to the left breast [de-identified] : frozen right shoulder

## 2019-05-20 NOTE — ASSESSMENT
[FreeTextEntry1] : IMP:\par BRANDO- Left breast Invasive lobular carcinoma, classic type, ER(+), [place](+), HER2(-) \par S/p left SABINE  lumpectomy and left axillary sentinel lymph node biopsy on 8/22/18. \par Final path: 8 mm invasive lobular carcinoma, ER/[place](+) HER2(-), negative margins, negative lymph nodes\par Tumor stage: pT1b pN0\par Completed XRT; On Arimidex 1 mg daily\par \par \par PLAN:\par Blood work 8/2019 (script in computer)\par B/L mammogram/sonogram 6/2019 (script in computer)\par RTO 3 months then Q4M

## 2019-05-20 NOTE — HISTORY OF PRESENT ILLNESS
[de-identified] : This is a 68 year old post menopausal female presents for a 3 month breast cancer follow up visit. She is s/p left SABINE  lumpectomy and left axillary sentinel lymph node biopsy on 18.  Final pathology revealed an 8 mm invasive lobular carcinoma, ER/CT(+) HER2(-), negative margins, negative lymph nodes. Tumor stage: pT1b pN0. \par \par Oncotype Dx=15\par \par She completed XRT under the care of Dr. Yara Palencia.  She is currently on Arimidex 1 mg daily. \par \par Labs May 2019:  CEA (<0.6 ng/mL); CA27.29 (19.5 U/mL); LFT's WNL\par \par Denies palpable breast masses, nipple discharge, skin dimpling or inversion.  Has c/o occasional left breast aching and left breast heaviness.  Completed PT for B/L shoulder pain. \par \par \par PERTINENT HISTORY:\par Initially consulted 18 for newly diagnosed left breast cancer. She was referred to our office by Dr. Waldemar Carlos (OB-GYN). \par \par She underwent a screening mammogram on 18 which revealed asymmetries in the upper outer quadrant, middle third depth of the LEFT breast. No dominant mass, suspicious grouping of calcifications or other sign of malignancy was identified. BIRADS 0\par \par Diagnostic left mammogram/left ultrasound 18 showed focal asymmetry in the upper outer left breast with associated architectural distortion measuring approximately 9-10 mm.  10 mm hypoechoic mass in the left breast 2:00 axis with mammographic correlate. US guided biopsy is recommended. BIRADS 4C. \par \par She is s/p US guided core needle biopsy of the left breast with post procedure mammogram on 18.  \par \par Pathology:\par -Invasive lobular carcinoma, classic type\par -[place] score 5/9\par -Lymphovascular permeation by tumor not seen\par -ER(+), CT(+), HER2(-)\par \par Denies palpable breast masses, nipple discharge, skin dimpling, inversion or breast pain\par Family history of breast cancer involves her paternal aunt at age 50 and maternal cousin @ age 61\par Denies family history of ovarian cancer\par Denies prior breast biopsies\par Denies past or current use of exogenous HRT\par \par Menarche age: 15\par LMP: \par    (age at first pregnancy 26)\par \par PMH notable for hypothyroidism (on Synthroid), left thyroid nodule, post infectious neuritis, liver lesion?, left kidney cyst, left ovarian cyst, 3 vaginal births, spinal surgery , uterine polyp  s/p D&C, spine surgery , right knee meniscus repair , left knee meniscus repair . Former smoker (quit ). Social alcohol use. \par \par Referring physician/OB-GYN: Dr. Waldemar Villarreal\par Internist: Dr. Manda Chacon\par

## 2019-06-12 ENCOUNTER — FORM ENCOUNTER (OUTPATIENT)
Age: 69
End: 2019-06-12

## 2019-06-13 ENCOUNTER — APPOINTMENT (OUTPATIENT)
Dept: ULTRASOUND IMAGING | Facility: CLINIC | Age: 69
End: 2019-06-13
Payer: MEDICARE

## 2019-06-13 ENCOUNTER — OUTPATIENT (OUTPATIENT)
Dept: OUTPATIENT SERVICES | Facility: HOSPITAL | Age: 69
LOS: 1 days | End: 2019-06-13
Payer: MEDICARE

## 2019-06-13 DIAGNOSIS — Z00.8 ENCOUNTER FOR OTHER GENERAL EXAMINATION: ICD-10-CM

## 2019-06-13 DIAGNOSIS — Z98.890 OTHER SPECIFIED POSTPROCEDURAL STATES: Chronic | ICD-10-CM

## 2019-06-13 DIAGNOSIS — R89.7 ABNORMAL HISTOLOGICAL FINDINGS IN SPECIMENS FROM OTHER ORGANS, SYSTEMS AND TISSUES: Chronic | ICD-10-CM

## 2019-06-13 PROCEDURE — 76536 US EXAM OF HEAD AND NECK: CPT

## 2019-06-13 PROCEDURE — 76536 US EXAM OF HEAD AND NECK: CPT | Mod: 26

## 2019-06-17 ENCOUNTER — APPOINTMENT (OUTPATIENT)
Dept: SURGERY | Facility: CLINIC | Age: 69
End: 2019-06-17
Payer: MEDICARE

## 2019-06-17 DIAGNOSIS — E04.1 NONTOXIC SINGLE THYROID NODULE: ICD-10-CM

## 2019-06-17 PROCEDURE — 99213 OFFICE O/P EST LOW 20 MIN: CPT

## 2019-06-17 NOTE — PHYSICAL EXAM
[de-identified] : no cervical or supraclavicular adenopathy, trachea midline, left lobe full without discreet nodule.  [Normal] : orientation to person, place, and time: normal

## 2019-06-17 NOTE — REASON FOR VISIT
[Follow-Up: _____] : a [unfilled] follow-up visit [Other: _____] : [unfilled] [FreeTextEntry2] : left thyroid nodule

## 2019-06-17 NOTE — ASSESSMENT
[FreeTextEntry1] : Patient with prior  left thyroid nodule benign biopsy, not present on current imaging, no suspicious findings on PE,  f/u US 12/2019, If stable, RTO 1 year

## 2019-06-17 NOTE — HISTORY OF PRESENT ILLNESS
[de-identified] : Patient referred by Dr. Chacon for evaluation of left thyroid nodule.  Thyroid US 5/2018 Left lower nodule 1.2 cm.  Patient with over 10 year history of hypothyroidism.  Denies dysphagia, hoarseness or radiation exposure. biopsy 6/2018 benign,  repeat US with enlargement.  denies dysphagia or pressure.  Biopsy 12/2018 benign.  f/u US 6/13/19 no discrete nodules consistent with thyroiditis.   Denies recent illness. questions answered. \par

## 2019-06-25 ENCOUNTER — FORM ENCOUNTER (OUTPATIENT)
Age: 69
End: 2019-06-25

## 2019-06-26 ENCOUNTER — OUTPATIENT (OUTPATIENT)
Dept: OUTPATIENT SERVICES | Facility: HOSPITAL | Age: 69
LOS: 1 days | End: 2019-06-26
Payer: MEDICARE

## 2019-06-26 ENCOUNTER — APPOINTMENT (OUTPATIENT)
Dept: MAMMOGRAPHY | Facility: CLINIC | Age: 69
End: 2019-06-26
Payer: MEDICARE

## 2019-06-26 ENCOUNTER — APPOINTMENT (OUTPATIENT)
Dept: ULTRASOUND IMAGING | Facility: CLINIC | Age: 69
End: 2019-06-26
Payer: MEDICARE

## 2019-06-26 DIAGNOSIS — Z98.890 OTHER SPECIFIED POSTPROCEDURAL STATES: Chronic | ICD-10-CM

## 2019-06-26 DIAGNOSIS — R89.7 ABNORMAL HISTOLOGICAL FINDINGS IN SPECIMENS FROM OTHER ORGANS, SYSTEMS AND TISSUES: Chronic | ICD-10-CM

## 2019-06-26 DIAGNOSIS — Z00.8 ENCOUNTER FOR OTHER GENERAL EXAMINATION: ICD-10-CM

## 2019-06-26 PROCEDURE — G0279: CPT

## 2019-06-26 PROCEDURE — 76641 ULTRASOUND BREAST COMPLETE: CPT | Mod: 26,50

## 2019-06-26 PROCEDURE — G0279: CPT | Mod: 26

## 2019-06-26 PROCEDURE — 76641 ULTRASOUND BREAST COMPLETE: CPT

## 2019-06-26 PROCEDURE — 77066 DX MAMMO INCL CAD BI: CPT | Mod: 26

## 2019-06-26 PROCEDURE — 77066 DX MAMMO INCL CAD BI: CPT

## 2019-08-16 LAB
ALBUMIN SERPL ELPH-MCNC: 4.4 G/DL
ALP BLD-CCNC: 114 U/L
ALT SERPL-CCNC: 21 U/L
AST SERPL-CCNC: 22 U/L
BILIRUB DIRECT SERPL-MCNC: 0.1 MG/DL
BILIRUB INDIRECT SERPL-MCNC: 0.4 MG/DL
BILIRUB SERPL-MCNC: 0.5 MG/DL
CANCER AG27-29 SERPL-ACNC: 16.8 U/ML
CEA SERPL-MCNC: <0.6 NG/ML
PROT SERPL-MCNC: 6.7 G/DL

## 2019-08-19 ENCOUNTER — APPOINTMENT (OUTPATIENT)
Dept: SURGICAL ONCOLOGY | Facility: CLINIC | Age: 69
End: 2019-08-19
Payer: MEDICARE

## 2019-08-19 VITALS
RESPIRATION RATE: 18 BRPM | TEMPERATURE: 98.4 F | BODY MASS INDEX: 30.53 KG/M2 | WEIGHT: 190 LBS | SYSTOLIC BLOOD PRESSURE: 132 MMHG | HEART RATE: 81 BPM | DIASTOLIC BLOOD PRESSURE: 82 MMHG | OXYGEN SATURATION: 98 % | HEIGHT: 66 IN

## 2019-08-19 PROCEDURE — 99214 OFFICE O/P EST MOD 30 MIN: CPT

## 2019-08-19 NOTE — PHYSICAL EXAM
[Normal] : supple, no neck mass and thyroid not enlarged [Normal Neck Lymph Nodes] : normal neck lymph nodes  [Normal Supraclavicular Lymph Nodes] : normal supraclavicular lymph nodes [Normal Axillary Lymph Nodes] : normal axillary lymph nodes [Normal] : oriented to person, place and time, with appropriate affect [de-identified] : no masses or discharge

## 2019-08-19 NOTE — ASSESSMENT
[FreeTextEntry1] : IMP:\par BRANDO- Left breast Invasive lobular carcinoma, classic type, ER(+), [place](+), HER2(-) \par S/p left SABINE  lumpectomy and left axillary sentinel lymph node biopsy on 8/22/18. \par Completed XRT; On Arimidex 1 mg daily\par \par \par PLAN:\par B/L mammogram/sonogram 6/2020\par RTO Q4 Months with bloodwork

## 2019-08-20 ENCOUNTER — TRANSCRIPTION ENCOUNTER (OUTPATIENT)
Age: 69
End: 2019-08-20

## 2019-09-25 ENCOUNTER — APPOINTMENT (OUTPATIENT)
Dept: SURGICAL ONCOLOGY | Facility: CLINIC | Age: 69
End: 2019-09-25
Payer: MEDICARE

## 2019-09-25 VITALS
RESPIRATION RATE: 18 BRPM | HEIGHT: 66 IN | SYSTOLIC BLOOD PRESSURE: 159 MMHG | HEART RATE: 80 BPM | WEIGHT: 190 LBS | OXYGEN SATURATION: 97 % | BODY MASS INDEX: 30.53 KG/M2 | DIASTOLIC BLOOD PRESSURE: 81 MMHG

## 2019-09-25 PROCEDURE — 99214 OFFICE O/P EST MOD 30 MIN: CPT

## 2019-09-25 NOTE — HISTORY OF PRESENT ILLNESS
[de-identified] : This is a 69 year old post menopausal female presents for a follow up exam.  She is s/p left SABINE  lumpectomy and left axillary sentinel lymph node biopsy on 18.  Final pathology revealed an 8 mm invasive lobular carcinoma, ER/ME(+) HER2(-), negative margins, negative lymph nodes. Tumor stage: pT1b pN0. \par \par Oncotype Dx=15\par \par She completed XRT under the care of Dr. Yara Palencia.  She is currently on Arimidex 1 mg daily. \par \par Labs Aug 2019:  CEA (<0.6 ng/mL); CA27.29 (19.5 U/mL); LFT's WNL\par \par B/L mammo/sono 2019 was without evidence of malignancy (Birads 2). \par \par Today she presents with a complaint of a "flutter" in her left breast since x 3 days.   Denies palpable breast masses, nipple discharge, skin changes, inversion or breast pain.\par \par \par PERTINENT HISTORY:\par Initially consulted 18 for newly diagnosed left breast cancer. She was referred to our office by Dr. Waldemar Carlos (OB-GYN). \par \par She underwent a screening mammogram on 18 which revealed asymmetries in the upper outer quadrant, middle third depth of the LEFT breast. No dominant mass, suspicious grouping of calcifications or other sign of malignancy was identified. BIRADS 0\par \par Diagnostic left mammogram/left ultrasound 18 showed focal asymmetry in the upper outer left breast with associated architectural distortion measuring approximately 9-10 mm.  10 mm hypoechoic mass in the left breast 2:00 axis with mammographic correlate. US guided biopsy is recommended. BIRADS 4C. \par \par \par Pathology:\par -Invasive lobular carcinoma, classic type\par -[place] score 5/9\par -Lymphovascular permeation by tumor not seen\par -ER(+), [place](+), HER2(-)\par \par Denies palpable breast masses, nipple discharge, skin dimpling, inversion or breast pain\par Family history of breast cancer involves her paternal aunt at age 50 and maternal cousin @ age 61\par Denies family history of ovarian cancer\par Denies prior breast biopsies\par Denies past or current use of exogenous HRT\par \par Menarche age: 15\par LMP: \par    (age at first pregnancy 26)\par \par PMH notable for hypothyroidism (on Synthroid), left thyroid nodule, post infectious neuritis, liver lesion?, left kidney cyst, left ovarian cyst, 3 vaginal births, spinal surgery , uterine polyp  s/p D&C, spine surgery , right knee meniscus repair , left knee meniscus repair . Former smoker (quit ). Social alcohol use. \par \par Referring physician/OB-GYN: Dr. Waldemar Villarreal\par Internist: Dr. Manda Chacon\par

## 2019-09-25 NOTE — ASSESSMENT
[FreeTextEntry1] : IMP:\par BRANDO- Left breast Invasive lobular carcinoma s/p left breast lumpectomy 8/22/18. \par Completed XRT; On Arimidex 1 mg daily\par Sensation in lumpectomy site secondary to healing\par \par \par PLAN:\par B/L mammogram/sonogram 6/2020\par RTO Q4 Months (Appt in system) with bloodwork (BW script given last visit)

## 2019-09-25 NOTE — PHYSICAL EXAM
[Normal] : supple, no neck mass and thyroid not enlarged [Normal Supraclavicular Lymph Nodes] : normal supraclavicular lymph nodes [Normal Axillary Lymph Nodes] : normal axillary lymph nodes [Normal] : oriented to person, place and time, with appropriate affect [de-identified] : Normal breast inspection and palpation of axillas. No dominant mass or nipple discharge bilaterally.  [FreeTextEntry1] : Deferred

## 2019-10-30 ENCOUNTER — RX RENEWAL (OUTPATIENT)
Age: 69
End: 2019-10-30

## 2019-12-11 ENCOUNTER — APPOINTMENT (OUTPATIENT)
Dept: SURGICAL ONCOLOGY | Facility: CLINIC | Age: 69
End: 2019-12-11
Payer: MEDICARE

## 2019-12-11 VITALS
RESPIRATION RATE: 18 BRPM | OXYGEN SATURATION: 99 % | SYSTOLIC BLOOD PRESSURE: 129 MMHG | DIASTOLIC BLOOD PRESSURE: 79 MMHG | WEIGHT: 190 LBS | HEART RATE: 78 BPM | HEIGHT: 66 IN | BODY MASS INDEX: 30.53 KG/M2

## 2019-12-11 PROCEDURE — 99214 OFFICE O/P EST MOD 30 MIN: CPT

## 2019-12-11 PROCEDURE — 36415 COLL VENOUS BLD VENIPUNCTURE: CPT

## 2019-12-11 NOTE — HISTORY OF PRESENT ILLNESS
[de-identified] : This is a 69 year old post menopausal female presents for a 4 month breast cancer follow up visit.  She is s/p left SABINE  lumpectomy and left axillary sentinel lymph node biopsy on 18.  Final pathology revealed an 8 mm invasive lobular carcinoma, ER/[place](+) HER2(-), negative margins, negative lymph nodes. Tumor stage: pT1b pN0. \par \par Oncotype Dx=15\par \par She completed XRT under the care of Dr. Yara Palencia.  She is currently on Arimidex 1 mg daily. \par \par Labs 2019:  CEA (0.6 ng/mL); CA27.29 (20.9 U/mL); LFT's WNL\par \par B/L mammo/sono 2019 was without evidence of malignancy (Birads 2). \par \par Denies palpable breast masses, nipple discharge, skin changes, inversion or breast pain.\par \par \par PERTINENT HISTORY:\par Initially consulted 18 for newly diagnosed left breast cancer. She was referred to our office by Dr. Waldemar Carlos (OB-GYN). \par \par She underwent a screening mammogram on 18 which revealed asymmetries in the upper outer quadrant, middle third depth of the LEFT breast. No dominant mass, suspicious grouping of calcifications or other sign of malignancy was identified. BIRADS 0\par \par Diagnostic left mammogram/left ultrasound 18 showed focal asymmetry in the upper outer left breast with associated architectural distortion measuring approximately 9-10 mm.  10 mm hypoechoic mass in the left breast 2:00 axis with mammographic correlate. US guided biopsy is recommended. BIRADS 4C. \par \par \par Pathology:\par -Invasive lobular carcinoma, classic type\par -[place] score 5/9\par -Lymphovascular permeation by tumor not seen\par -ER(+), [place](+), HER2(-)\par \par Denies palpable breast masses, nipple discharge, skin dimpling, inversion or breast pain\par Family history of breast cancer involves her paternal aunt at age 50 and maternal cousin @ age 61\par Denies family history of ovarian cancer\par Denies prior breast biopsies\par Denies past or current use of exogenous HRT\par \par Menarche age: 15\par LMP: \par    (age at first pregnancy 26)\par \par PMH notable for hypothyroidism (on Synthroid), left thyroid nodule, post infectious neuritis, liver lesion?, left kidney cyst, left ovarian cyst, 3 vaginal births, spinal surgery , uterine polyp  s/p D&C, spine surgery , right knee meniscus repair , left knee meniscus repair . Former smoker (quit ). Social alcohol use. \par \par Referring physician/OB-GYN: Dr. Waldemar Villarreal\par Internist: Dr. Manda Chacon\par

## 2019-12-11 NOTE — ASSESSMENT
[FreeTextEntry1] : IMP:\par BRANDO- Left breast Invasive lobular carcinoma s/p left breast lumpectomy 8/22/18. \par Completed XRT; On Arimidex 1 mg daily\par \par \par PLAN:\par B/L mammogram/sonogram 6/2020\par RTO Q4 months with BW \par Genetic testing today

## 2019-12-11 NOTE — PHYSICAL EXAM
[Normal] : supple, no neck mass and thyroid not enlarged [Normal Supraclavicular Lymph Nodes] : normal supraclavicular lymph nodes [Normal Axillary Lymph Nodes] : normal axillary lymph nodes [Normal] : oriented to person, place and time, with appropriate affect [de-identified] : Normal breast inspection and palpation of axillas. No dominant mass or nipple discharge bilaterally.  [FreeTextEntry1] : Deferred

## 2019-12-16 ENCOUNTER — FORM ENCOUNTER (OUTPATIENT)
Age: 69
End: 2019-12-16

## 2019-12-16 LAB
ALBUMIN SERPL ELPH-MCNC: 4.1 G/DL
ALP BLD-CCNC: 108 U/L
ALT SERPL-CCNC: 22 U/L
AST SERPL-CCNC: 21 U/L
BILIRUB DIRECT SERPL-MCNC: <0.1 MG/DL
BILIRUB INDIRECT SERPL-MCNC: >0.4 MG/DL
BILIRUB SERPL-MCNC: 0.5 MG/DL
CANCER AG27-29 SERPL-ACNC: 20.9 U/ML
CEA SERPL-MCNC: 0.6 NG/ML
PROT SERPL-MCNC: 7.3 G/DL

## 2019-12-17 ENCOUNTER — OUTPATIENT (OUTPATIENT)
Dept: OUTPATIENT SERVICES | Facility: HOSPITAL | Age: 69
LOS: 1 days | End: 2019-12-17
Payer: MEDICARE

## 2019-12-17 ENCOUNTER — APPOINTMENT (OUTPATIENT)
Dept: ULTRASOUND IMAGING | Facility: IMAGING CENTER | Age: 69
End: 2019-12-17
Payer: MEDICARE

## 2019-12-17 DIAGNOSIS — R89.7 ABNORMAL HISTOLOGICAL FINDINGS IN SPECIMENS FROM OTHER ORGANS, SYSTEMS AND TISSUES: Chronic | ICD-10-CM

## 2019-12-17 DIAGNOSIS — E04.1 NONTOXIC SINGLE THYROID NODULE: ICD-10-CM

## 2019-12-17 DIAGNOSIS — Z98.890 OTHER SPECIFIED POSTPROCEDURAL STATES: Chronic | ICD-10-CM

## 2019-12-17 PROCEDURE — 76536 US EXAM OF HEAD AND NECK: CPT

## 2019-12-17 PROCEDURE — 76536 US EXAM OF HEAD AND NECK: CPT | Mod: 26

## 2020-04-16 ENCOUNTER — APPOINTMENT (OUTPATIENT)
Dept: SURGICAL ONCOLOGY | Facility: CLINIC | Age: 70
End: 2020-04-16

## 2020-04-20 LAB
ALBUMIN SERPL ELPH-MCNC: 4.2 G/DL
ALP BLD-CCNC: 112 U/L
ALT SERPL-CCNC: 25 U/L
AST SERPL-CCNC: 25 U/L
BILIRUB DIRECT SERPL-MCNC: <0.1 MG/DL
BILIRUB INDIRECT SERPL-MCNC: >0.5 MG/DL
BILIRUB SERPL-MCNC: 0.6 MG/DL
CANCER AG27-29 SERPL-ACNC: 19.4 U/ML
CEA SERPL-MCNC: 0.7 NG/ML
PROT SERPL-MCNC: 7.4 G/DL

## 2020-06-18 ENCOUNTER — APPOINTMENT (OUTPATIENT)
Dept: SURGICAL ONCOLOGY | Facility: CLINIC | Age: 70
End: 2020-06-18

## 2020-06-22 ENCOUNTER — APPOINTMENT (OUTPATIENT)
Dept: SURGERY | Facility: CLINIC | Age: 70
End: 2020-06-22
Payer: MEDICARE

## 2020-06-22 PROCEDURE — 99213 OFFICE O/P EST LOW 20 MIN: CPT

## 2020-06-22 RX ORDER — LEVOTHYROXINE SODIUM 112 UG/1
112 TABLET ORAL
Refills: 0 | Status: DISCONTINUED | COMMUNITY
End: 2020-06-22

## 2020-06-22 NOTE — ASSESSMENT
[FreeTextEntry1] : Patient with prior  left thyroid nodule benign biopsy, not present on current imaging, no suspicious findings on PE,  will continue under care of PCP and return as needed.

## 2020-06-22 NOTE — PHYSICAL EXAM
[de-identified] : no cervical or supraclavicular adenopathy, trachea midline, left lobe full without discreet nodule.  [Normal] : no neck adenopathy [de-identified] : Skin:  normal appearance.  no rash, nodules, vesicles, or erythema,\par Musculoskeletal:  full range of motion and no deformities appreciated\par Neurological:  grossly intact\par Psychiatric:  oriented to person, place and time with appropriate affect

## 2020-06-22 NOTE — HISTORY OF PRESENT ILLNESS
[de-identified] : Patient referred by Dr. Chacon for evaluation of left thyroid nodule.  Thyroid US 5/2018 Left lower nodule 1.2 cm.  Patient with over 10 year history of hypothyroidism.  Denies dysphagia, hoarseness or radiation exposure. biopsy 6/2018 benign,  repeat US with enlargement.  denies dysphagia or pressure.  Biopsy 12/2018 benign.  f/u US 6/13/19 no discrete nodules consistent with thyroiditis. repeat US 12/2019 no nodules.  TSH was suppressed now on 100 feeling well.    Denies recent illness. questions answered. \par

## 2020-06-22 NOTE — REASON FOR VISIT
[Follow-Up: _____] : a [unfilled] follow-up visit [FreeTextEntry2] : left thyroid nodule [Other: _____] : [unfilled]

## 2020-07-10 ENCOUNTER — RESULT REVIEW (OUTPATIENT)
Age: 70
End: 2020-07-10

## 2020-07-10 ENCOUNTER — APPOINTMENT (OUTPATIENT)
Dept: ULTRASOUND IMAGING | Facility: CLINIC | Age: 70
End: 2020-07-10
Payer: MEDICARE

## 2020-07-10 ENCOUNTER — OUTPATIENT (OUTPATIENT)
Dept: OUTPATIENT SERVICES | Facility: HOSPITAL | Age: 70
LOS: 1 days | End: 2020-07-10
Payer: MEDICARE

## 2020-07-10 ENCOUNTER — APPOINTMENT (OUTPATIENT)
Dept: MAMMOGRAPHY | Facility: CLINIC | Age: 70
End: 2020-07-10
Payer: MEDICARE

## 2020-07-10 DIAGNOSIS — Z98.890 OTHER SPECIFIED POSTPROCEDURAL STATES: Chronic | ICD-10-CM

## 2020-07-10 DIAGNOSIS — C50.919 MALIGNANT NEOPLASM OF UNSPECIFIED SITE OF UNSPECIFIED FEMALE BREAST: ICD-10-CM

## 2020-07-10 DIAGNOSIS — R89.7 ABNORMAL HISTOLOGICAL FINDINGS IN SPECIMENS FROM OTHER ORGANS, SYSTEMS AND TISSUES: Chronic | ICD-10-CM

## 2020-07-10 PROCEDURE — G0279: CPT

## 2020-07-10 PROCEDURE — 77066 DX MAMMO INCL CAD BI: CPT

## 2020-07-10 PROCEDURE — 77066 DX MAMMO INCL CAD BI: CPT | Mod: 26

## 2020-07-10 PROCEDURE — 76641 ULTRASOUND BREAST COMPLETE: CPT | Mod: 26,50

## 2020-07-10 PROCEDURE — G0279: CPT | Mod: 26

## 2020-07-10 PROCEDURE — 76641 ULTRASOUND BREAST COMPLETE: CPT

## 2020-08-07 DIAGNOSIS — Z01.818 ENCOUNTER FOR OTHER PREPROCEDURAL EXAMINATION: ICD-10-CM

## 2020-08-08 ENCOUNTER — APPOINTMENT (OUTPATIENT)
Dept: DISASTER EMERGENCY | Facility: CLINIC | Age: 70
End: 2020-08-08

## 2020-08-09 LAB — SARS-COV-2 N GENE NPH QL NAA+PROBE: NOT DETECTED

## 2020-08-10 ENCOUNTER — APPOINTMENT (OUTPATIENT)
Dept: SURGICAL ONCOLOGY | Facility: CLINIC | Age: 70
End: 2020-08-10
Payer: MEDICARE

## 2020-08-13 ENCOUNTER — APPOINTMENT (OUTPATIENT)
Dept: SURGICAL ONCOLOGY | Facility: CLINIC | Age: 70
End: 2020-08-13
Payer: MEDICARE

## 2020-08-13 VITALS
OXYGEN SATURATION: 99 % | HEART RATE: 78 BPM | WEIGHT: 190 LBS | BODY MASS INDEX: 30.53 KG/M2 | SYSTOLIC BLOOD PRESSURE: 138 MMHG | HEIGHT: 66 IN | DIASTOLIC BLOOD PRESSURE: 84 MMHG

## 2020-08-13 PROCEDURE — 99214 OFFICE O/P EST MOD 30 MIN: CPT

## 2020-08-13 NOTE — PHYSICAL EXAM
[de-identified] : Normal breast inspection and palpation of axillas. No dominant mass or nipple discharge bilaterally.  [FreeTextEntry1] : Deferred

## 2020-08-13 NOTE — HISTORY OF PRESENT ILLNESS
[de-identified] : This is a 69 year old post menopausal female presents for a breast cancer follow up visit.  She is s/p left SABINE  lumpectomy and left axillary sentinel lymph node biopsy on 18.  Final pathology revealed an 8 mm invasive lobular carcinoma, ER/[place](+) HER2(-), negative margins, negative lymph nodes. Tumor stage: pT1b pN0. \par \par Oncotype Dx=15\par \par She completed XRT under the care of Dr. Yraa Palencia on 10/29/18.  She is currently on Arimidex 1 mg daily. \par \par Genetic testing by [facility] Dec 2019 was negative.\par \par Labs 2020 :  CEA (0.7 ng/mL); CA27.29 (19.4 U/mL); LFT's WNL\par \par B/L Mammo/Sono 2020 were without evidence of malignancy (BI-RADS 2). \par \par Denies palpable breast masses, nipple discharge, skin changes, inversion or breast pain.\par \par \par PERTINENT HISTORY:\par Initially consulted 18 for newly diagnosed left breast cancer. She was referred to our office by Dr. Waldemar Carlos (OB-GYN). \par \par She underwent a screening mammogram on 18 which revealed asymmetries in the upper outer quadrant, middle third depth of the LEFT breast. No dominant mass, suspicious grouping of calcifications or other sign of malignancy was identified. BIRADS 0\par \par Diagnostic left mammogram/left ultrasound 18 showed focal asymmetry in the upper outer left breast with associated architectural distortion measuring approximately 9-10 mm.  10 mm hypoechoic mass in the left breast 2:00 axis with mammographic correlate. US guided biopsy is recommended. BIRADS 4C. \par \par \par Pathology:\par -Invasive lobular carcinoma, classic type\par -[place] score 5/9\par -Lymphovascular permeation by tumor not seen\par -ER(+), [place](+), HER2(-)\par \par Denies palpable breast masses, nipple discharge, skin dimpling, inversion or breast pain\par Family history of breast cancer involves her paternal aunt at age 50 and maternal cousin @ age 61\par Denies family history of ovarian cancer\par Denies prior breast biopsies\par Denies past or current use of exogenous HRT\par \par Menarche age: 15\par LMP: \par    (age at first pregnancy 26)\par \par PMH notable for hypothyroidism (on Synthroid), left thyroid nodule, post infectious neuritis, liver lesion?, left kidney cyst, left ovarian cyst, 3 vaginal births, spinal surgery , uterine polyp  s/p D&C, spine surgery , right knee meniscus repair , left knee meniscus repair . Former smoker (quit ). Social alcohol use. \par \par Referring physician/OB-GYN: Dr. Waldemar Villarreal\par Internist: Dr. Manda Chacon\par

## 2020-08-13 NOTE — ASSESSMENT
[FreeTextEntry1] : IMP:\par BRANDO- Left breast Invasive lobular carcinoma s/p left breast lumpectomy 8/22/18. \par Completed XRT; On Arimidex 1 mg daily\par MMG/US July 2020 BIRADS 2\par \par \par PLAN:\par B/L mammogram/sonogram 7/2021 - ordered\par RTO q6 months with Blood Work - ordered\par

## 2020-08-27 ENCOUNTER — OUTPATIENT (OUTPATIENT)
Dept: OUTPATIENT SERVICES | Facility: HOSPITAL | Age: 70
LOS: 1 days | End: 2020-08-27
Payer: MEDICARE

## 2020-08-27 ENCOUNTER — APPOINTMENT (OUTPATIENT)
Dept: RADIOLOGY | Facility: CLINIC | Age: 70
End: 2020-08-27
Payer: MEDICARE

## 2020-08-27 DIAGNOSIS — Z98.890 OTHER SPECIFIED POSTPROCEDURAL STATES: Chronic | ICD-10-CM

## 2020-08-27 DIAGNOSIS — R89.7 ABNORMAL HISTOLOGICAL FINDINGS IN SPECIMENS FROM OTHER ORGANS, SYSTEMS AND TISSUES: Chronic | ICD-10-CM

## 2020-08-27 DIAGNOSIS — Z00.8 ENCOUNTER FOR OTHER GENERAL EXAMINATION: ICD-10-CM

## 2020-08-27 PROCEDURE — 77080 DXA BONE DENSITY AXIAL: CPT | Mod: 26

## 2020-08-27 PROCEDURE — 77080 DXA BONE DENSITY AXIAL: CPT

## 2021-02-15 LAB
ALBUMIN SERPL ELPH-MCNC: 4.2 G/DL
ALP BLD-CCNC: 128 U/L
ALT SERPL-CCNC: 37 U/L
AST SERPL-CCNC: 33 U/L
BILIRUB DIRECT SERPL-MCNC: <0.1 MG/DL
BILIRUB INDIRECT SERPL-MCNC: >0.5 MG/DL
BILIRUB SERPL-MCNC: 0.6 MG/DL
CANCER AG27-29 SERPL-ACNC: 20.8 U/ML
CEA SERPL-MCNC: 0.7 NG/ML
PROT SERPL-MCNC: 7.1 G/DL

## 2021-02-18 ENCOUNTER — APPOINTMENT (OUTPATIENT)
Dept: SURGICAL ONCOLOGY | Facility: CLINIC | Age: 71
End: 2021-02-18
Payer: MEDICARE

## 2021-03-15 ENCOUNTER — APPOINTMENT (OUTPATIENT)
Dept: SURGICAL ONCOLOGY | Facility: CLINIC | Age: 71
End: 2021-03-15
Payer: MEDICARE

## 2021-03-15 VITALS
BODY MASS INDEX: 29.73 KG/M2 | DIASTOLIC BLOOD PRESSURE: 83 MMHG | SYSTOLIC BLOOD PRESSURE: 156 MMHG | OXYGEN SATURATION: 97 % | HEIGHT: 66 IN | HEART RATE: 72 BPM | WEIGHT: 185 LBS | RESPIRATION RATE: 18 BRPM

## 2021-03-15 PROCEDURE — 99214 OFFICE O/P EST MOD 30 MIN: CPT

## 2021-03-15 NOTE — HISTORY OF PRESENT ILLNESS
[de-identified] : This is a 70 year old post menopausal female presents for a breast cancer follow up visit.  She is s/p left SABINE  lumpectomy and left axillary sentinel lymph node biopsy on 18.  Final pathology revealed an 8 mm invasive lobular carcinoma, ER/RI(+) HER2(-), negative margins, negative lymph nodes. Tumor stage: pT1b pN0. \par \par Oncotype Dx=15\par \par She completed XRT under the care of Dr. Yara Palencia on 10/29/18.  She is currently on Arimidex 1 mg daily. \par \par Genetic testing by [facility] Dec 2019 was negative.\par \par Labs 2021 :  CEA (0.7 ng/mL); CA27.29 (20.8 U/mL); LFT's WNL (Alk Phos slightly elevated- 128 U/L)\par \par B/L Mammo/Sono 2020 were without evidence of malignancy (BI-RADS 2). \par \par Denies palpable breast masses, nipple discharge, skin changes, inversion or breast pain.\par Pt. states she was diagnosed with Celiac disease and she fell and broke her nose in 2021. Recovering well. \par \par \par PERTINENT HISTORY:\par Initially consulted 18 for newly diagnosed left breast cancer. She was referred to our office by Dr. Waldemar Carlos (OB-GYN). \par \par She underwent a screening mammogram on 18 which revealed asymmetries in the upper outer quadrant, middle third depth of the LEFT breast. No dominant mass, suspicious grouping of calcifications or other sign of malignancy was identified. BIRADS 0\par \par Diagnostic left mammogram/left ultrasound 18 showed focal asymmetry in the upper outer left breast with associated architectural distortion measuring approximately 9-10 mm.  10 mm hypoechoic mass in the left breast 2:00 axis with mammographic correlate. US guided biopsy is recommended. BIRADS 4C. \par \par \par Pathology:\par -Invasive lobular carcinoma, classic type\par -[place] score 5/9\par -Lymphovascular permeation by tumor not seen\par -ER(+), RI(+), HER2(-)\par \par Denies palpable breast masses, nipple discharge, skin dimpling, inversion or breast pain\par Family history of breast cancer involves her paternal aunt at age 50 and maternal cousin @ age 61\par Denies family history of ovarian cancer\par Denies prior breast biopsies\par Denies past or current use of exogenous HRT\par \par Menarche age: 15\par LMP: \par    (age at first pregnancy 26)\par \par PMH notable for hypothyroidism (on Synthroid), left thyroid nodule, post infectious neuritis, liver lesion?, left kidney cyst, left ovarian cyst, 3 vaginal births, spinal surgery , uterine polyp  s/p D&C, spine surgery , right knee meniscus repair , left knee meniscus repair . Former smoker (quit ). Social alcohol use. \par \par Referring physician/OB-GYN: Dr. Waldemar Villarreal\par Internist: Dr. Manda Chacon\par

## 2021-03-15 NOTE — ASSESSMENT
[FreeTextEntry1] : IMP:\par BRANDO- Left breast Invasive lobular carcinoma s/p left breast lumpectomy 8/22/18. \par Completed XRT; On Arimidex 1 mg daily\par MMG/US July 2020 BIRADS 2\par \par \par PLAN:\par B/L mammogram/sonogram 7/2021 - ordered\par RTO q6 months with Blood Work \par

## 2021-03-15 NOTE — REVIEW OF SYSTEMS
[FreeTextEntry4] : s/p fall, broke nose 1/2021 [FreeTextEntry8] : celiac disease  [de-identified] : neuropathy

## 2021-03-15 NOTE — PHYSICAL EXAM
[Normal] : supple, no neck mass and thyroid not enlarged [Normal Supraclavicular Lymph Nodes] : normal supraclavicular lymph nodes [Normal Axillary Lymph Nodes] : normal axillary lymph nodes [Normal] : oriented to person, place and time, with appropriate affect [de-identified] : Normal breast inspection and palpation of axillas. No dominant mass or nipple discharge bilaterally.  [FreeTextEntry1] : Deferred

## 2021-05-17 ENCOUNTER — APPOINTMENT (OUTPATIENT)
Dept: GASTROENTEROLOGY | Facility: CLINIC | Age: 71
End: 2021-05-17
Payer: MEDICARE

## 2021-05-17 VITALS
SYSTOLIC BLOOD PRESSURE: 124 MMHG | OXYGEN SATURATION: 94 % | HEIGHT: 66 IN | TEMPERATURE: 96.6 F | HEART RATE: 87 BPM | DIASTOLIC BLOOD PRESSURE: 80 MMHG | WEIGHT: 184.25 LBS | BODY MASS INDEX: 29.61 KG/M2

## 2021-05-17 DIAGNOSIS — Z87.42 PERSONAL HISTORY OF OTHER DISEASES OF THE FEMALE GENITAL TRACT: ICD-10-CM

## 2021-05-17 DIAGNOSIS — E06.3 AUTOIMMUNE THYROIDITIS: ICD-10-CM

## 2021-05-17 DIAGNOSIS — R10.13 EPIGASTRIC PAIN: ICD-10-CM

## 2021-05-17 DIAGNOSIS — E78.00 PURE HYPERCHOLESTEROLEMIA, UNSPECIFIED: ICD-10-CM

## 2021-05-17 DIAGNOSIS — R11.0 NAUSEA: ICD-10-CM

## 2021-05-17 DIAGNOSIS — Z86.39 PERSONAL HISTORY OF OTHER ENDOCRINE, NUTRITIONAL AND METABOLIC DISEASE: ICD-10-CM

## 2021-05-17 DIAGNOSIS — Z87.891 PERSONAL HISTORY OF NICOTINE DEPENDENCE: ICD-10-CM

## 2021-05-17 PROCEDURE — 99212 OFFICE O/P EST SF 10 MIN: CPT

## 2021-05-17 RX ORDER — DEXLANSOPRAZOLE 60 MG/1
60 CAPSULE, DELAYED RELEASE ORAL
Qty: 90 | Refills: 0 | Status: ACTIVE | COMMUNITY
Start: 2020-12-28

## 2021-05-17 RX ORDER — AMLODIPINE BESYLATE 2.5 MG/1
2.5 TABLET ORAL
Qty: 90 | Refills: 0 | Status: ACTIVE | COMMUNITY
Start: 2021-05-05

## 2021-05-17 RX ORDER — AZITHROMYCIN 250 MG/1
250 TABLET, FILM COATED ORAL
Qty: 6 | Refills: 0 | Status: ACTIVE | COMMUNITY
Start: 2021-03-18

## 2021-05-17 RX ORDER — AMLODIPINE BESYLATE 5 MG/1
5 TABLET ORAL
Qty: 90 | Refills: 0 | Status: ACTIVE | COMMUNITY
Start: 2021-03-26

## 2021-05-17 RX ORDER — ONDANSETRON HYDROCHLORIDE 2 MG/ML
4 INJECTION INTRAMUSCULAR; INTRAVENOUS
Refills: 0 | Status: ACTIVE | COMMUNITY

## 2021-05-17 NOTE — HISTORY OF PRESENT ILLNESS
[FreeTextEntry1] : I saw Mrs. Martinez  in the office today.  The patient is a 70-year-old female known to our service.  She has a history of breast cancer and is currently on anastrozole.  Patient has no history of diabetes or coronary artery disease and her appetite is fair with no dysphagia or unexplained weight loss.  Patient does have celiac disease and follows a diet diligently.  She denies acid reflux but as you know is plagued by chronic intermittent nausea.  She has had several trials of proton pump inhibitors with little clinical improvement she underwent a gastric emptying study which was negative for gastroparesis.  She does notice amelioration of the symptoms when she takes Zofran.  She takes this intermittently.  The patient had 2 months free of nausea and she felt that it was due to the fact she stopped her multivitamins calcium and vitamin D.  Slowly introduce some of these over-the-counter medications and has had 4 days of nausea recently.  She denies a history of tobacco caffeine or ethanol use.  The patient is mildly constipated due to a celiac diet and the fact that she cannot eat grains.  She also admits to not drinking enough water.

## 2021-05-17 NOTE — PHYSICAL EXAM
[General Appearance - Alert] : alert [General Appearance - In No Acute Distress] : in no acute distress [General Appearance - Well Nourished] : well nourished [General Appearance - Well Developed] : well developed [Respiration, Rhythm And Depth] : normal respiratory rhythm and effort [Auscultation Breath Sounds / Voice Sounds] : lungs were clear to auscultation bilaterally [Apical Impulse] : the apical impulse was normal [Heart Rate And Rhythm] : heart rate was normal and rhythm regular [Bowel Sounds] : normal bowel sounds [Abdomen Soft] : soft [Abdomen Tenderness] : non-tender [] : no hepato-splenomegaly

## 2021-05-17 NOTE — ASSESSMENT
[FreeTextEntry1] : Mrs Martinez is a 70-year-old female with a history of breast cancer currently under the care of a oncologist.  She goes for her regular checkups.  As you know she has had chronic intermittent nausea for over a year.  She has had a thorough GI work-up including an endoscopy and as you know has celiac disease.  She is compliant with the diet.  The gastric emptying study was negative for gastroparesis.  She felt that some of her vitamins may be contributing to her symptoms.  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  She is taking anastrozole.  I see no absolute contraindication to the patient taking Zofran when she feels the symptoms.  She requires the medication rarely and actually has been doing a bit better recently.  I doubt we are dealing with a central cause of the nausea since she has had work-up for breast cancer and staging and it is unlikely we are dealing with any intracranial pathology.  Patient will get back to me with a progress report.  She was told to increase her water intake and use Benefiber for her constipation.

## 2021-07-12 ENCOUNTER — APPOINTMENT (OUTPATIENT)
Dept: MAMMOGRAPHY | Facility: IMAGING CENTER | Age: 71
End: 2021-07-12
Payer: MEDICARE

## 2021-07-12 ENCOUNTER — RESULT REVIEW (OUTPATIENT)
Age: 71
End: 2021-07-12

## 2021-07-12 ENCOUNTER — APPOINTMENT (OUTPATIENT)
Dept: ULTRASOUND IMAGING | Facility: IMAGING CENTER | Age: 71
End: 2021-07-12
Payer: MEDICARE

## 2021-07-12 ENCOUNTER — OUTPATIENT (OUTPATIENT)
Dept: OUTPATIENT SERVICES | Facility: HOSPITAL | Age: 71
LOS: 1 days | End: 2021-07-12
Payer: MEDICARE

## 2021-07-12 DIAGNOSIS — C50.919 MALIGNANT NEOPLASM OF UNSPECIFIED SITE OF UNSPECIFIED FEMALE BREAST: ICD-10-CM

## 2021-07-12 DIAGNOSIS — Z98.890 OTHER SPECIFIED POSTPROCEDURAL STATES: Chronic | ICD-10-CM

## 2021-07-12 DIAGNOSIS — R89.7 ABNORMAL HISTOLOGICAL FINDINGS IN SPECIMENS FROM OTHER ORGANS, SYSTEMS AND TISSUES: Chronic | ICD-10-CM

## 2021-07-12 PROCEDURE — G0279: CPT | Mod: 26

## 2021-07-12 PROCEDURE — 76641 ULTRASOUND BREAST COMPLETE: CPT | Mod: 26,50

## 2021-07-12 PROCEDURE — 77066 DX MAMMO INCL CAD BI: CPT | Mod: 26

## 2021-07-12 PROCEDURE — 76641 ULTRASOUND BREAST COMPLETE: CPT

## 2021-07-12 PROCEDURE — G0279: CPT

## 2021-07-12 PROCEDURE — 77066 DX MAMMO INCL CAD BI: CPT

## 2021-09-13 LAB
ALBUMIN SERPL ELPH-MCNC: 4.4 G/DL
ALP BLD-CCNC: 134 U/L
ALT SERPL-CCNC: 27 U/L
AST SERPL-CCNC: 29 U/L
BILIRUB DIRECT SERPL-MCNC: 0.2 MG/DL
BILIRUB INDIRECT SERPL-MCNC: 0.6 MG/DL
BILIRUB SERPL-MCNC: 0.7 MG/DL
CANCER AG27-29 SERPL-ACNC: 20.5 U/ML
CEA SERPL-MCNC: 0.7 NG/ML
PROT SERPL-MCNC: 7.2 G/DL

## 2021-09-14 RX ORDER — ONDANSETRON 4 MG/1
4 TABLET ORAL TWICE DAILY
Qty: 180 | Refills: 0 | Status: ACTIVE | COMMUNITY
Start: 2021-09-14 | End: 1900-01-01

## 2021-09-16 ENCOUNTER — APPOINTMENT (OUTPATIENT)
Dept: SURGICAL ONCOLOGY | Facility: CLINIC | Age: 71
End: 2021-09-16
Payer: MEDICARE

## 2021-09-16 VITALS
DIASTOLIC BLOOD PRESSURE: 83 MMHG | SYSTOLIC BLOOD PRESSURE: 133 MMHG | HEIGHT: 66 IN | RESPIRATION RATE: 17 BRPM | OXYGEN SATURATION: 96 % | WEIGHT: 185 LBS | HEART RATE: 81 BPM | BODY MASS INDEX: 29.73 KG/M2

## 2021-09-16 PROCEDURE — 99214 OFFICE O/P EST MOD 30 MIN: CPT

## 2021-09-16 NOTE — HISTORY OF PRESENT ILLNESS
[de-identified] : Donna Martinez is a 71 year old post menopausal female presents for a breast cancer follow up visit.  She is s/p left SABINE  lumpectomy and left axillary sentinel lymph node biopsy on 18.  Final pathology revealed an 8 mm invasive lobular carcinoma, ER/OH(+) HER2(-), negative margins, negative lymph nodes. Tumor stage: pT1b pN0. \par \par Oncotype Dx=15\par \par She completed XRT under the care of Dr. Yara Palencia on 10/29/18.  She is currently on Arimidex 1 mg daily. \par \par Genetic testing by [facility] Dec 2019 was negative.\par \par Labs 2021 :  CEA (0.7 ng/mL); CA27.29 (20.5 U/mL); LFT's WNL (Alk Phos slightly elevated- 128 U/L)\par \par B/L Mammo/Sono 2021 were without evidence of malignancy (BI-RADS 2). \par \par Denies palpable breast masses, nipple discharge, skin changes, inversion or breast pain.\par Pt. states she was diagnosed with Celiac disease and she fell and broke her nose in 2021. Recovering well. \par \par \par PERTINENT HISTORY:\par Initially consulted 18 for newly diagnosed left breast cancer. She was referred to our office by Dr. Waldemar Carlos (OB-GYN). \par \par She underwent a screening mammogram on 18 which revealed asymmetries in the upper outer quadrant, middle third depth of the LEFT breast. No dominant mass, suspicious grouping of calcifications or other sign of malignancy was identified. BIRADS 0\par \par Diagnostic left mammogram/left ultrasound 18 showed focal asymmetry in the upper outer left breast with associated architectural distortion measuring approximately 9-10 mm.  10 mm hypoechoic mass in the left breast 2:00 axis with mammographic correlate. US guided biopsy is recommended. BIRADS 4C. \par \par \par Pathology:\par -Invasive lobular carcinoma, classic type\par -[place]ukh[place] score 5/9\par -Lymphovascular permeation by tumor not seen\par -ER(+), OH(+), HER2(-)\par \par Denies palpable breast masses, nipple discharge, skin dimpling, inversion or breast pain\par Family history of breast cancer involves her paternal aunt at age 50 and maternal cousin @ age 61\par Denies family history of ovarian cancer\par Denies prior breast biopsies\par Denies past or current use of exogenous HRT\par \par Menarche age: 15\par LMP: \par    (age at first pregnancy 26)\par \par PMH notable for hypothyroidism (on Synthroid), left thyroid nodule, post infectious neuritis, liver lesion?, left kidney cyst, left ovarian cyst, 3 vaginal births, spinal surgery , uterine polyp  s/p D&C, spine surgery , right knee meniscus repair , left knee meniscus repair . Former smoker (quit ). Social alcohol use. \par \par Referring physician/OB-GYN: Dr. Waldemar Villarreal\par Internist: Dr. Manda Chacon\par

## 2021-09-16 NOTE — ASSESSMENT
[FreeTextEntry1] : IMP:\par BRADNO- Left breast Invasive lobular carcinoma s/p left breast lumpectomy 8/22/18. \par Completed XRT; On Arimidex 1 mg daily\par MMG/US July 2021 BIRADS 2\par \par \par PLAN:\par B/L mammogram/sonogram 7/2022 - ordered\par RTO q6 months with Blood Work \par

## 2021-09-16 NOTE — PHYSICAL EXAM
[Normal] : supple, no neck mass and thyroid not enlarged [Normal Neck Lymph Nodes] : normal neck lymph nodes  [Normal Supraclavicular Lymph Nodes] : normal supraclavicular lymph nodes [Normal Axillary Lymph Nodes] : normal axillary lymph nodes [Normal] : oriented to person, place and time, with appropriate affect [de-identified] : fibrocystic but no masses

## 2022-01-13 ENCOUNTER — APPOINTMENT (OUTPATIENT)
Dept: ULTRASOUND IMAGING | Facility: CLINIC | Age: 72
End: 2022-01-13
Payer: MEDICARE

## 2022-01-13 ENCOUNTER — OUTPATIENT (OUTPATIENT)
Dept: OUTPATIENT SERVICES | Facility: HOSPITAL | Age: 72
LOS: 1 days | End: 2022-01-13
Payer: MEDICARE

## 2022-01-13 DIAGNOSIS — Z98.890 OTHER SPECIFIED POSTPROCEDURAL STATES: Chronic | ICD-10-CM

## 2022-01-13 DIAGNOSIS — R89.7 ABNORMAL HISTOLOGICAL FINDINGS IN SPECIMENS FROM OTHER ORGANS, SYSTEMS AND TISSUES: Chronic | ICD-10-CM

## 2022-01-13 DIAGNOSIS — Z00.8 ENCOUNTER FOR OTHER GENERAL EXAMINATION: ICD-10-CM

## 2022-01-13 PROCEDURE — 76536 US EXAM OF HEAD AND NECK: CPT | Mod: 26

## 2022-01-13 PROCEDURE — 76536 US EXAM OF HEAD AND NECK: CPT

## 2022-01-13 PROCEDURE — 76700 US EXAM ABDOM COMPLETE: CPT | Mod: 26

## 2022-01-13 PROCEDURE — 76700 US EXAM ABDOM COMPLETE: CPT

## 2022-02-24 ENCOUNTER — APPOINTMENT (OUTPATIENT)
Dept: SURGICAL ONCOLOGY | Facility: CLINIC | Age: 72
End: 2022-02-24
Payer: MEDICARE

## 2022-02-24 VITALS
DIASTOLIC BLOOD PRESSURE: 85 MMHG | BODY MASS INDEX: 29.73 KG/M2 | RESPIRATION RATE: 16 BRPM | HEIGHT: 66 IN | OXYGEN SATURATION: 98 % | TEMPERATURE: 97.4 F | WEIGHT: 185 LBS | SYSTOLIC BLOOD PRESSURE: 146 MMHG | HEART RATE: 85 BPM

## 2022-02-24 DIAGNOSIS — R16.0 HEPATOMEGALY, NOT ELSEWHERE CLASSIFIED: ICD-10-CM

## 2022-02-24 PROCEDURE — 99214 OFFICE O/P EST MOD 30 MIN: CPT

## 2022-02-24 NOTE — PHYSICAL EXAM
[Normal] : supple, no neck mass and thyroid not enlarged [Normal Supraclavicular Lymph Nodes] : normal supraclavicular lymph nodes [Normal Groin Lymph Nodes] : normal groin lymph nodes [Normal] : oriented to person, place and time, with appropriate affect [de-identified] : No masses or tenderness

## 2022-02-24 NOTE — ASSESSMENT
[FreeTextEntry1] : IMP:\par new Adenocarcinoma of the liver\par BRANDO- Left breast Invasive lobular carcinoma s/p left breast lumpectomy 8/22/18. \par Completed XRT; On Arimidex 1 mg daily\par MMG/US July 2021 BIRADS 2\par \par \par PLAN:\par Evaluation in Hepatic multidisciplinary clinic\par EGD and colonoscopy scheduled already\par B/L mammogram/sonogram 7/2022 \par Patient due for Blood Work March 2022 (Breast caner) \par CEA, CA 19-9, Hepatic panel ordered now \par \par  \par

## 2022-02-24 NOTE — CONSULT LETTER
[Dear  ___] : Dear  [unfilled], [Consult Letter:] : I had the pleasure of evaluating your patient, [unfilled]. [Please see my note below.] : Please see my note below. [Consult Closing:] : Thank you very much for allowing me to participate in the care of this patient.  If you have any questions, please do not hesitate to contact me. [Sincerely,] : Sincerely, [FreeTextEntry1] : I will keep you informed of the plan of treatment. [FreeTextEntry3] : Stevo Marin MD FACS\par Chief of Surgical Oncology\par \par  [DrJosé  ___] : Dr. WATKINS

## 2022-02-24 NOTE — HISTORY OF PRESENT ILLNESS
[de-identified] : Donna Martinez is a 71 year old post menopausal female presents for a newly found liver adenocarcinoma. she has had a mass followed for many years but now it was increasing in size so a biopsy was done which showed adenocarcinoma.\par \par US abdomen 22: 4.8 x 3.7 x 4.1 cm right hepatic lesion possibly hemangioma \par \par MRI abd 22: 7.4 cm lobulated mass centrally hepatic dome. \par \par Biopsy of liver 2/15/22: right liver mass positive for adenocarcinoma in a fibrotic stroma. \par \par \par She is s/p left SABINE  lumpectomy and left axillary sentinel lymph node biopsy on 18.  Final pathology revealed an 8 mm invasive lobular carcinoma, ER/MI(+) HER2(-), negative margins, negative lymph nodes. Tumor stage: pT1b pN0. \par \par Oncotype Dx=15\par \par She completed XRT under the care of Dr. Yara Palencia on 10/29/18.  She is currently on Arimidex 1 mg daily. \par \par Genetic testing by [facility] Dec 2019 was negative.\par \par Labs 2021 :  CEA (0.7 ng/mL); CA27.29 (20.5 U/mL); LFT's WNL (Alk Phos slightly elevated- 128 U/L)\par \par B/L Mammo/Sono 2021 were without evidence of malignancy (BI-RADS 2). \par \par Labs due in 2022 \par \par \par Denies palpable breast masses, nipple discharge, skin changes, inversion or breast pain.\par Pt. states she was diagnosed with Celiac disease and she fell and broke her nose in 2021. Recovering well. \par \par \par PERTINENT HISTORY:\par Initially consulted 18 for newly diagnosed left breast cancer. She was referred to our office by Dr. Waldemar Carlos (OB-GYN). \par \par She underwent a screening mammogram on 18 which revealed asymmetries in the upper outer quadrant, middle third depth of the LEFT breast. No dominant mass, suspicious grouping of calcifications or other sign of malignancy was identified. BIRADS 0\par \par Diagnostic left mammogram/left ultrasound 18 showed focal asymmetry in the upper outer left breast with associated architectural distortion measuring approximately 9-10 mm.  10 mm hypoechoic mass in the left breast 2:00 axis with mammographic correlate. US guided biopsy is recommended. BIRADS 4C. \par \par \par Pathology:\par -Invasive lobular carcinoma, classic type\par -Hub[place] score 5/9\par -Lymphovascular permeation by tumor not seen\par -ER(+), MI(+), HER2(-)\par \par Denies palpable breast masses, nipple discharge, skin dimpling, inversion or breast pain\par Family history of breast cancer involves her paternal aunt at age 50 and maternal cousin @ age 61\par Denies family history of ovarian cancer\par Denies prior breast biopsies\par Denies past or current use of exogenous HRT\par \par Menarche age: 15\par LMP: \par    (age at first pregnancy 26)\par \par PMH notable for hypothyroidism (on Synthroid), left thyroid nodule, post infectious neuritis, liver lesion?, left kidney cyst, left ovarian cyst, 3 vaginal births, spinal surgery , uterine polyp  s/p D&C, spine surgery , right knee meniscus repair , left knee meniscus repair . Former smoker (quit ). Social alcohol use. \par \par Referring physician/OB-GYN: Dr. Waldemar Villarreal\par Internist: Dr. Manda Chacon\par

## 2022-02-25 ENCOUNTER — LABORATORY RESULT (OUTPATIENT)
Age: 72
End: 2022-02-25

## 2022-03-11 ENCOUNTER — RESULT REVIEW (OUTPATIENT)
Age: 72
End: 2022-03-11

## 2022-03-11 ENCOUNTER — OUTPATIENT (OUTPATIENT)
Dept: OUTPATIENT SERVICES | Facility: HOSPITAL | Age: 72
LOS: 1 days | End: 2022-03-11
Payer: MEDICARE

## 2022-03-11 DIAGNOSIS — R89.7 ABNORMAL HISTOLOGICAL FINDINGS IN SPECIMENS FROM OTHER ORGANS, SYSTEMS AND TISSUES: Chronic | ICD-10-CM

## 2022-03-11 DIAGNOSIS — C80.1 MALIGNANT (PRIMARY) NEOPLASM, UNSPECIFIED: ICD-10-CM

## 2022-03-11 DIAGNOSIS — Z98.890 OTHER SPECIFIED POSTPROCEDURAL STATES: Chronic | ICD-10-CM

## 2022-03-11 PROCEDURE — 88321 CONSLTJ&REPRT SLD PREP ELSWR: CPT

## 2022-03-13 LAB
ALBUMIN SERPL ELPH-MCNC: 4.4 G/DL
ALP BLD-CCNC: 131 U/L
ALT SERPL-CCNC: 31 U/L
AST SERPL-CCNC: 28 U/L
BILIRUB DIRECT SERPL-MCNC: 0.1 MG/DL
BILIRUB INDIRECT SERPL-MCNC: 0.6 MG/DL
BILIRUB SERPL-MCNC: 0.7 MG/DL
CANCER AG19-9 SERPL-ACNC: 6 U/ML
CEA SERPL-MCNC: <0.6 NG/ML
PROT SERPL-MCNC: 7.1 G/DL

## 2022-03-14 LAB — SURGICAL PATHOLOGY STUDY: SIGNIFICANT CHANGE UP

## 2022-08-30 ENCOUNTER — RESULT REVIEW (OUTPATIENT)
Age: 72
End: 2022-08-30

## 2022-08-30 ENCOUNTER — APPOINTMENT (OUTPATIENT)
Dept: MAMMOGRAPHY | Facility: CLINIC | Age: 72
End: 2022-08-30

## 2022-08-30 ENCOUNTER — APPOINTMENT (OUTPATIENT)
Dept: ULTRASOUND IMAGING | Facility: CLINIC | Age: 72
End: 2022-08-30

## 2022-08-30 ENCOUNTER — OUTPATIENT (OUTPATIENT)
Dept: OUTPATIENT SERVICES | Facility: HOSPITAL | Age: 72
LOS: 1 days | End: 2022-08-30
Payer: MEDICARE

## 2022-08-30 DIAGNOSIS — Z98.890 OTHER SPECIFIED POSTPROCEDURAL STATES: Chronic | ICD-10-CM

## 2022-08-30 DIAGNOSIS — R89.7 ABNORMAL HISTOLOGICAL FINDINGS IN SPECIMENS FROM OTHER ORGANS, SYSTEMS AND TISSUES: Chronic | ICD-10-CM

## 2022-08-30 DIAGNOSIS — C50.919 MALIGNANT NEOPLASM OF UNSPECIFIED SITE OF UNSPECIFIED FEMALE BREAST: ICD-10-CM

## 2022-08-30 PROCEDURE — 77066 DX MAMMO INCL CAD BI: CPT | Mod: 26

## 2022-08-30 PROCEDURE — G0279: CPT | Mod: 26

## 2022-08-30 PROCEDURE — G0279: CPT

## 2022-08-30 PROCEDURE — 77066 DX MAMMO INCL CAD BI: CPT

## 2022-08-30 PROCEDURE — 76641 ULTRASOUND BREAST COMPLETE: CPT

## 2022-08-30 PROCEDURE — 76641 ULTRASOUND BREAST COMPLETE: CPT | Mod: 26,50

## 2022-08-31 ENCOUNTER — LABORATORY RESULT (OUTPATIENT)
Age: 72
End: 2022-08-31

## 2022-09-22 ENCOUNTER — NON-APPOINTMENT (OUTPATIENT)
Age: 72
End: 2022-09-22

## 2022-09-26 ENCOUNTER — APPOINTMENT (OUTPATIENT)
Dept: SURGICAL ONCOLOGY | Facility: CLINIC | Age: 72
End: 2022-09-26

## 2022-09-26 VITALS
WEIGHT: 172 LBS | HEART RATE: 96 BPM | HEIGHT: 66 IN | DIASTOLIC BLOOD PRESSURE: 73 MMHG | SYSTOLIC BLOOD PRESSURE: 108 MMHG | RESPIRATION RATE: 16 BRPM | TEMPERATURE: 97.5 F | BODY MASS INDEX: 27.64 KG/M2 | OXYGEN SATURATION: 95 %

## 2022-09-26 DIAGNOSIS — C22.9 MALIGNANT NEOPLASM OF LIVER, NOT SPECIFIED AS PRIMARY OR SECONDARY: ICD-10-CM

## 2022-09-26 PROCEDURE — 99214 OFFICE O/P EST MOD 30 MIN: CPT

## 2022-09-26 NOTE — PHYSICAL EXAM
[Normal] : supple, no neck mass and thyroid not enlarged [Normal Supraclavicular Lymph Nodes] : normal supraclavicular lymph nodes [Normal Groin Lymph Nodes] : normal groin lymph nodes [Normal] : oriented to person, place and time, with appropriate affect [de-identified] : no masses or discharge bilaterally [de-identified] : No masses or tenderness

## 2022-09-26 NOTE — HISTORY OF PRESENT ILLNESS
[de-identified] : Donna Martinez is a 72 year old post menopausal female presents for a follow-up visit\par \par \par She is s/p left SABINE  lumpectomy and left axillary sentinel lymph node biopsy on 8/22/18.  Final pathology revealed an 8 mm invasive lobular carcinoma, ER/SD(+) HER2(-), negative margins, negative lymph nodes. Tumor stage: pT1b pN0. \par \par Oncotype Dx=15\par \par She completed XRT under the care of Dr. Yara Palencia on 10/29/18.  She is currently on Arimidex 1 mg daily. \par \par Genetic testing by [facility] Dec 2019 was negative.\par \par 2/2022: she has had a mass followed for many years but now it was increasing in size so a biopsy was done which showed adenocarcinoma.\par \par US abdomen 1/13/22: 4.8 x 3.7 x 4.1 cm right hepatic lesion possibly hemangioma \par MRI abd 2/7/22: 7.4 cm lobulated mass centrally hepatic dome. \par \par Biopsy of liver 2/15/22: right liver mass positive for adenocarcinoma in a fibrotic stroma. \par \par EGD & colonoscoyp done 4/2022 with Dr. Hernandez, she reports gastritis & benign polyps, will have report sent to our office.\par \par She is following with Dr. Yehuda Renteria ([facility]) and Dr. Sean Martinez ([facility]), reports completed neoadjuvant chemo & RT with minimal response\par \par B/L mammo/sono 8/30/2022- BIRADS 2\par Labs 8/2022- WNL\par \par \par \par \par Referring physician/OB-GYN: Dr. Waldemar Villarreal\par Internist: Dr. Manda Chacon

## 2022-09-26 NOTE — REASON FOR VISIT
[Follow-Up Visit] : a follow-up visit for [Breast Cancer] : breast cancer [Liver Cancer] : liver cancer

## 2022-09-26 NOTE — ASSESSMENT
[FreeTextEntry1] : IMP:\par BRANDO- Left breast Invasive lobular carcinoma s/p left breast lumpectomy 8/22/18. \par Completed XRT; On Arimidex 1 mg daily\par \par labs 8/2022- WNL\par B/L mammo/sono 8/2022- BIRADS 2\par \par she has had a mass followed for many years but now it was increasing in size so a biopsy was done which showed adenocarcinoma.\par \par US abdomen 1/13/22: 4.8 x 3.7 x 4.1 cm right hepatic lesion possibly hemangioma \par MRI abd 2/7/22: 7.4 cm lobulated mass located centrally at hepatic dome, encases the hepatic veins c/w hepatic malignancy \par US Biopsy of right liver 2/15/22: positive for adenocarcinoma in a fibrotic stroma. \par \par EGD & colonoscoyp done 4/2022 with Dr. Hernandez, she reports gastritis & benign polyps, will have report sent to our office.\par \par She is following with Dr. Yehuda Renteria ([facility]) and Dr. Sean Martinez ([facility]), reports completed neoadjuvant chemo & RT with minimal response, planned to start immunotherapy now\par \par PLAN:\par RTO Q6 months w/ labs \par annual mammogram and sonogram 8/2023\par \par \par \par  \par

## 2023-03-15 ENCOUNTER — LABORATORY RESULT (OUTPATIENT)
Age: 73
End: 2023-03-15

## 2023-03-16 LAB
CANCER AG27-29 SERPL-ACNC: 25 U/ML
CEA SERPL-MCNC: 0.6 NG/ML

## 2023-03-23 ENCOUNTER — APPOINTMENT (OUTPATIENT)
Dept: SURGICAL ONCOLOGY | Facility: CLINIC | Age: 73
End: 2023-03-23
Payer: MEDICARE

## 2023-03-23 ENCOUNTER — RESULT REVIEW (OUTPATIENT)
Age: 73
End: 2023-03-23

## 2023-03-23 VITALS
HEART RATE: 88 BPM | HEIGHT: 66 IN | SYSTOLIC BLOOD PRESSURE: 129 MMHG | BODY MASS INDEX: 28.45 KG/M2 | OXYGEN SATURATION: 96 % | WEIGHT: 177 LBS | RESPIRATION RATE: 15 BRPM | DIASTOLIC BLOOD PRESSURE: 84 MMHG

## 2023-03-23 PROCEDURE — 99214 OFFICE O/P EST MOD 30 MIN: CPT

## 2023-03-23 NOTE — REVIEW OF SYSTEMS
[Negative] : Heme/Lymph [FreeTextEntry8] : Nausea but no vomiting ; no jaundice; failed treatment of cholangiocarcinoma; No chemo at this point

## 2023-03-23 NOTE — ASSESSMENT
[FreeTextEntry1] : IMP:\par BRANDO- Left breast Invasive lobular carcinoma s/p left breast lumpectomy 8/22/18. \par Completed XRT; On Arimidex 1 mg daily\par \par labs 3/2023- WNL\par B/L mammo/sono 8/2022- BIRADS 2\par \par she has had a mass followed for many years but now it was increasing in size so a biopsy was done which showed adenocarcinoma.\par \par US abdomen 1/13/22: 4.8 x 3.7 x 4.1 cm right hepatic lesion possibly hemangioma \par MRI abd 2/7/22: 7.4 cm lobulated mass located centrally at hepatic dome, encases the hepatic veins c/w hepatic malignancy \par US Biopsy of right liver 2/15/22: positive for adenocarcinoma in a fibrotic stroma. \par \par EGD & colonoscoyp done 4/2022 with Dr. Hernandez, she reports gastritis & benign polyps, will have report sent to our office.\par \par She is following with Dr. Yehuda Renteria ([facility]) and Dr. Sean Martinez ([facility]), reports completed neoadjuvant chemo & RT with minimal response\par She is now receiving treatment with [facility] for cholangiocarcinoma  of the liver \par \par PLAN:\par RTO Q6 months w/ labs \par annual mammogram and sonogram 8/2023\par \par \par \par  \par

## 2023-03-23 NOTE — PHYSICAL EXAM
[Normal] : supple, no neck mass and thyroid not enlarged [Normal Supraclavicular Lymph Nodes] : normal supraclavicular lymph nodes [Normal Axillary Lymph Nodes] : normal axillary lymph nodes [Normal] : oriented to person, place and time, with appropriate affect [de-identified] : mild fibrocystic changes but no masses

## 2023-03-23 NOTE — HISTORY OF PRESENT ILLNESS
[de-identified] : Donna Martinez is a 72 year old post menopausal female presents for a follow-up visit\par \par \par She is s/p left SABINE  lumpectomy and left axillary sentinel lymph node biopsy on 8/22/18.  Final pathology revealed an 8 mm invasive lobular carcinoma, ER/WA(+) HER2(-), negative margins, negative lymph nodes. Tumor stage: pT1b pN0. \par \par Oncotype Dx=15\par \par She completed XRT under the care of Dr. Yara Palencia on 10/29/18.  She is currently on Arimidex 1 mg daily. \par \par Genetic testing by [facility] Dec 2019 was negative.\par \par 2/2022: she has had a mass followed for many years but now it was increasing in size so a biopsy was done which showed adenocarcinoma.\par \par US abdomen 1/13/22: 4.8 x 3.7 x 4.1 cm right hepatic lesion possibly hemangioma \par MRI abd 2/7/22: 7.4 cm lobulated mass centrally hepatic dome. \par \par Biopsy of liver 2/15/22: right liver mass positive for adenocarcinoma in a fibrotic stroma. \par \par EGD & colonoscoyp done 4/2022 with Dr. Hernandez, she reports gastritis & benign polyps, will have report sent to our office.\par \par She is following with Dr. Yehuda Renteria ([facility]) and Dr. Sean Martinez ([facility]), reports completed neoadjuvant chemo & RT with minimal response\par \par B/L mammo/sono 8/30/2022- BIRADS 2\par \par Labs 3/2023- WNL\par \par \par \par \par Referring physician/OB-GYN: Dr. Waldemar Villarreal\par Internist: Dr. Ghulam Holt   Wheelchair/Stroller

## 2023-08-18 RX ORDER — ANASTROZOLE TABLETS 1 MG/1
1 TABLET ORAL DAILY
Qty: 90 | Refills: 3 | Status: ACTIVE | COMMUNITY
Start: 2018-10-29 | End: 1900-01-01

## 2023-09-07 ENCOUNTER — APPOINTMENT (OUTPATIENT)
Dept: MAMMOGRAPHY | Facility: CLINIC | Age: 73
End: 2023-09-07
Payer: MEDICARE

## 2023-09-07 ENCOUNTER — RESULT REVIEW (OUTPATIENT)
Age: 73
End: 2023-09-07

## 2023-09-07 ENCOUNTER — OUTPATIENT (OUTPATIENT)
Dept: OUTPATIENT SERVICES | Facility: HOSPITAL | Age: 73
LOS: 1 days | End: 2023-09-07
Payer: MEDICARE

## 2023-09-07 ENCOUNTER — APPOINTMENT (OUTPATIENT)
Dept: ULTRASOUND IMAGING | Facility: CLINIC | Age: 73
End: 2023-09-07
Payer: MEDICARE

## 2023-09-07 DIAGNOSIS — Z98.890 OTHER SPECIFIED POSTPROCEDURAL STATES: Chronic | ICD-10-CM

## 2023-09-07 DIAGNOSIS — R89.7 ABNORMAL HISTOLOGICAL FINDINGS IN SPECIMENS FROM OTHER ORGANS, SYSTEMS AND TISSUES: Chronic | ICD-10-CM

## 2023-09-07 DIAGNOSIS — C50.919 MALIGNANT NEOPLASM OF UNSPECIFIED SITE OF UNSPECIFIED FEMALE BREAST: ICD-10-CM

## 2023-09-07 PROCEDURE — 77063 BREAST TOMOSYNTHESIS BI: CPT | Mod: 26

## 2023-09-07 PROCEDURE — 77067 SCR MAMMO BI INCL CAD: CPT

## 2023-09-07 PROCEDURE — 76641 ULTRASOUND BREAST COMPLETE: CPT | Mod: 26,50,3G

## 2023-09-07 PROCEDURE — 77067 SCR MAMMO BI INCL CAD: CPT | Mod: 26

## 2023-09-07 PROCEDURE — 77063 BREAST TOMOSYNTHESIS BI: CPT

## 2023-09-07 PROCEDURE — 76641 ULTRASOUND BREAST COMPLETE: CPT

## 2023-09-18 ENCOUNTER — LABORATORY RESULT (OUTPATIENT)
Age: 73
End: 2023-09-18

## 2023-09-21 ENCOUNTER — APPOINTMENT (OUTPATIENT)
Dept: SURGICAL ONCOLOGY | Facility: CLINIC | Age: 73
End: 2023-09-21
Payer: MEDICARE

## 2023-09-22 ENCOUNTER — APPOINTMENT (OUTPATIENT)
Dept: SURGICAL ONCOLOGY | Facility: CLINIC | Age: 73
End: 2023-09-22
Payer: MEDICARE

## 2023-09-22 VITALS
RESPIRATION RATE: 17 BRPM | WEIGHT: 186 LBS | SYSTOLIC BLOOD PRESSURE: 126 MMHG | DIASTOLIC BLOOD PRESSURE: 81 MMHG | OXYGEN SATURATION: 98 % | BODY MASS INDEX: 30.99 KG/M2 | HEIGHT: 65 IN | HEART RATE: 84 BPM

## 2023-09-22 PROCEDURE — 99214 OFFICE O/P EST MOD 30 MIN: CPT

## 2024-03-11 LAB
ALBUMIN SERPL ELPH-MCNC: 4 G/DL
ALP BLD-CCNC: 191 U/L
ALT SERPL-CCNC: 24 U/L
AST SERPL-CCNC: 31 U/L
BILIRUB DIRECT SERPL-MCNC: 0.1 MG/DL
BILIRUB INDIRECT SERPL-MCNC: 0.3 MG/DL
BILIRUB SERPL-MCNC: 0.4 MG/DL
CEA SERPL-MCNC: 1.3 NG/ML
PROT SERPL-MCNC: 7.3 G/DL

## 2024-03-12 LAB — CANCER AG27-29 SERPL-ACNC: 21.6 U/ML

## 2024-03-21 ENCOUNTER — APPOINTMENT (OUTPATIENT)
Dept: SURGICAL ONCOLOGY | Facility: CLINIC | Age: 74
End: 2024-03-21
Payer: MEDICARE

## 2024-03-21 VITALS
BODY MASS INDEX: 30.99 KG/M2 | SYSTOLIC BLOOD PRESSURE: 128 MMHG | HEART RATE: 81 BPM | HEIGHT: 65 IN | WEIGHT: 186 LBS | OXYGEN SATURATION: 98 % | DIASTOLIC BLOOD PRESSURE: 80 MMHG

## 2024-03-21 DIAGNOSIS — C50.919 MALIGNANT NEOPLASM OF UNSPECIFIED SITE OF UNSPECIFIED FEMALE BREAST: ICD-10-CM

## 2024-03-21 PROCEDURE — 99214 OFFICE O/P EST MOD 30 MIN: CPT

## 2024-03-21 NOTE — PHYSICAL EXAM
[Normal] : supple, no neck mass and thyroid not enlarged [Normal Supraclavicular Lymph Nodes] : normal supraclavicular lymph nodes [Normal Axillary Lymph Nodes] : normal axillary lymph nodes [Normal] : grossly intact [de-identified] : fiborcystic changes but no masses

## 2024-03-21 NOTE — ASSESSMENT
[FreeTextEntry1] : IMP: BRANDO- Left breast Invasive lobular carcinoma s/p left breast lumpectomy 8/22/18.  Completed XRT; On Arimidex 1 mg daily  labs 3/2023- WNL B/L mammo/sono 8/2022- BIRADS 2  she has had a mass followed for many years but now it was increasing in size so a biopsy was done which showed adenocarcinoma.  US abdomen 1/13/22: 4.8 x 3.7 x 4.1 cm right hepatic lesion possibly hemangioma  MRI abd 2/7/22: 7.4 cm lobulated mass located centrally at hepatic dome, encases the hepatic veins c/w hepatic malignancy  US Biopsy of right liver 2/15/22: positive for adenocarcinoma in a fibrotic stroma.   EGD & colonoscoyp done 4/2022 with Dr. Hernandez, she reports gastritis & benign polyps, will have report sent to our office.  She is following with Dr. Yehuda Renteria ([facility]) and Dr. Sean Martinez ([facility]), reports completed neoadjuvant chemo & RT with minimal response She is now receiving treatment with [facility] for cholangiocarcinoma  of the liver   B/L mammo/sono 9/7/23: BIRADS 2   PLAN: RTO Q6 months w/ labs  annual mammogram and sonogram 9/2024

## 2024-03-21 NOTE — REVIEW OF SYSTEMS
[Negative] : Heme/Lymph [FreeTextEntry8] :  no jaundice; failed treatment of cholangiocarcinoma; No chemo at this point

## 2024-03-21 NOTE — HISTORY OF PRESENT ILLNESS
[de-identified] : Donna Martinez is a 73 year old post menopausal female presents for a follow-up visit   She is s/p left SABINE  lumpectomy and left axillary sentinel lymph node biopsy on 8/22/18.  Final pathology revealed an 8 mm invasive lobular carcinoma, ER/MA(+) HER2(-), negative margins, negative lymph nodes. Tumor stage: pT1b pN0.   Oncotype Dx=15  She completed XRT under the care of Dr. Yara Palencia on 10/29/18.  She is currently on Arimidex 1 mg daily.   Genetic testing by [facility] Dec 2019 was negative.  2/2022: she has had a mass followed for many years but now it was increasing in size so a biopsy was done which showed adenocarcinoma.  US abdomen 1/13/22: 4.8 x 3.7 x 4.1 cm right hepatic lesion possibly hemangioma  MRI abd 2/7/22: 7.4 cm lobulated mass centrally hepatic dome.   Biopsy of liver 2/15/22: right liver mass positive for adenocarcinoma in a fibrotic stroma.   EGD & colonoscoyp done 4/2022 with Dr. Hernandez, she reports gastritis & benign polyps, will have report sent to our office.  She is following with Dr. Yehuda Renteria ([facility]) and Dr. Sean Martinez ([facility]), reports completed neoadjuvant chemo & RT with minimal response  B/L mammo/sono 9/07/2023- BIRADS 2  Labs 3/2024- WNL   Referring physician/OB-GYN: Dr. Waldemar Villarreal Internist: Dr. Ghulam Holt

## 2024-09-09 ENCOUNTER — APPOINTMENT (OUTPATIENT)
Dept: CT IMAGING | Facility: CLINIC | Age: 74
End: 2024-09-09
Payer: MEDICARE

## 2024-09-09 ENCOUNTER — OUTPATIENT (OUTPATIENT)
Dept: OUTPATIENT SERVICES | Facility: HOSPITAL | Age: 74
LOS: 1 days | End: 2024-09-09
Payer: MEDICARE

## 2024-09-09 DIAGNOSIS — R89.7 ABNORMAL HISTOLOGICAL FINDINGS IN SPECIMENS FROM OTHER ORGANS, SYSTEMS AND TISSUES: Chronic | ICD-10-CM

## 2024-09-09 DIAGNOSIS — S52.122A DISPLACED FRACTURE OF HEAD OF LEFT RADIUS, INITIAL ENCOUNTER FOR CLOSED FRACTURE: ICD-10-CM

## 2024-09-09 DIAGNOSIS — Z98.890 OTHER SPECIFIED POSTPROCEDURAL STATES: Chronic | ICD-10-CM

## 2024-09-09 PROCEDURE — 73200 CT UPPER EXTREMITY W/O DYE: CPT | Mod: MH

## 2024-09-09 PROCEDURE — 76376 3D RENDER W/INTRP POSTPROCES: CPT | Mod: 26

## 2024-09-09 PROCEDURE — 73200 CT UPPER EXTREMITY W/O DYE: CPT | Mod: 26,LT,MH

## 2024-09-11 ENCOUNTER — OUTPATIENT (OUTPATIENT)
Dept: OUTPATIENT SERVICES | Facility: HOSPITAL | Age: 74
LOS: 1 days | End: 2024-09-11
Payer: MEDICARE

## 2024-09-11 VITALS
HEART RATE: 98 BPM | OXYGEN SATURATION: 96 % | DIASTOLIC BLOOD PRESSURE: 83 MMHG | SYSTOLIC BLOOD PRESSURE: 126 MMHG | TEMPERATURE: 98 F | HEIGHT: 65 IN | WEIGHT: 189.16 LBS | RESPIRATION RATE: 18 BRPM

## 2024-09-11 DIAGNOSIS — Z98.890 OTHER SPECIFIED POSTPROCEDURAL STATES: Chronic | ICD-10-CM

## 2024-09-11 DIAGNOSIS — S52.122A DISPLACED FRACTURE OF HEAD OF LEFT RADIUS, INITIAL ENCOUNTER FOR CLOSED FRACTURE: ICD-10-CM

## 2024-09-11 DIAGNOSIS — R89.7 ABNORMAL HISTOLOGICAL FINDINGS IN SPECIMENS FROM OTHER ORGANS, SYSTEMS AND TISSUES: Chronic | ICD-10-CM

## 2024-09-11 DIAGNOSIS — Z01.818 ENCOUNTER FOR OTHER PREPROCEDURAL EXAMINATION: ICD-10-CM

## 2024-09-11 LAB
ALBUMIN SERPL ELPH-MCNC: 3.7 G/DL — SIGNIFICANT CHANGE UP (ref 3.3–5)
ALP SERPL-CCNC: 196 U/L — HIGH (ref 40–120)
ALT FLD-CCNC: 31 U/L — SIGNIFICANT CHANGE UP (ref 10–45)
ANION GAP SERPL CALC-SCNC: 12 MMOL/L — SIGNIFICANT CHANGE UP (ref 5–17)
AST SERPL-CCNC: 43 U/L — HIGH (ref 10–40)
BILIRUB SERPL-MCNC: 0.7 MG/DL — SIGNIFICANT CHANGE UP (ref 0.2–1.2)
BUN SERPL-MCNC: 14 MG/DL — SIGNIFICANT CHANGE UP (ref 7–23)
CALCIUM SERPL-MCNC: 9.2 MG/DL — SIGNIFICANT CHANGE UP (ref 8.4–10.5)
CHLORIDE SERPL-SCNC: 105 MMOL/L — SIGNIFICANT CHANGE UP (ref 96–108)
CO2 SERPL-SCNC: 23 MMOL/L — SIGNIFICANT CHANGE UP (ref 22–31)
CREAT SERPL-MCNC: 0.54 MG/DL — SIGNIFICANT CHANGE UP (ref 0.5–1.3)
EGFR: 98 ML/MIN/1.73M2 — SIGNIFICANT CHANGE UP
GLUCOSE SERPL-MCNC: 105 MG/DL — HIGH (ref 70–99)
HCT VFR BLD CALC: 41.2 % — SIGNIFICANT CHANGE UP (ref 34.5–45)
HGB BLD-MCNC: 12.8 G/DL — SIGNIFICANT CHANGE UP (ref 11.5–15.5)
MCHC RBC-ENTMCNC: 26.8 PG — LOW (ref 27–34)
MCHC RBC-ENTMCNC: 31.1 GM/DL — LOW (ref 32–36)
MCV RBC AUTO: 86.2 FL — SIGNIFICANT CHANGE UP (ref 80–100)
NRBC # BLD: 0 /100 WBCS — SIGNIFICANT CHANGE UP (ref 0–0)
PLATELET # BLD AUTO: 164 K/UL — SIGNIFICANT CHANGE UP (ref 150–400)
POTASSIUM SERPL-MCNC: 4.1 MMOL/L — SIGNIFICANT CHANGE UP (ref 3.5–5.3)
POTASSIUM SERPL-SCNC: 4.1 MMOL/L — SIGNIFICANT CHANGE UP (ref 3.5–5.3)
PROT SERPL-MCNC: 7.4 G/DL — SIGNIFICANT CHANGE UP (ref 6–8.3)
RBC # BLD: 4.78 M/UL — SIGNIFICANT CHANGE UP (ref 3.8–5.2)
RBC # FLD: 16.6 % — HIGH (ref 10.3–14.5)
SODIUM SERPL-SCNC: 140 MMOL/L — SIGNIFICANT CHANGE UP (ref 135–145)
WBC # BLD: 6.4 K/UL — SIGNIFICANT CHANGE UP (ref 3.8–10.5)
WBC # FLD AUTO: 6.4 K/UL — SIGNIFICANT CHANGE UP (ref 3.8–10.5)

## 2024-09-11 PROCEDURE — 85027 COMPLETE CBC AUTOMATED: CPT

## 2024-09-11 PROCEDURE — 80053 COMPREHEN METABOLIC PANEL: CPT

## 2024-09-11 PROCEDURE — G0463: CPT

## 2024-09-11 NOTE — H&P PST ADULT - NSICDXPASTSURGICALHX_GEN_ALL_CORE_FT
PAST SURGICAL HISTORY:  Abnormal breast biopsy left breast 7/2018    H/O cervical polypectomy     History of arthroscopy of both knees 2013 right knee meniscus repair, 6/2018 left knee meniscus repair    History of lumbar surgery 1980 , 2011    S/P breast lumpectomy

## 2024-09-11 NOTE — H&P PST ADULT - ASSESSMENT
DASI Score: 7.5  DASI Activity: able to go up one flight of stairs or walk 1-2 blocks without difficulty during normal state  Loose or removable teeth: denies

## 2024-09-11 NOTE — H&P PST ADULT - HISTORY OF PRESENT ILLNESS
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 Denies LOC, headtreicke- CT unremarkable.  This is a 74 year old female with past medical history of Hypothyroidism,  Left breast cancer s/p lumpectomy (2018), Liver adenocarcinoma (2022) s/p completed chemo4/2022/ radiation 7/222 via RT CW port under surveillance with  MRIs every 3 months. Reports having mechanical fall on 9/8 in Parking lot, Evaluated in [facility] tripped and hit face. Denies LOC, - CT unremarkable. Today presenting to PST accompanied by daughter, ORIF Left elbow radial head fracture, possible radial head replacement on 9/14/24 with Dr. Castillo.

## 2024-09-11 NOTE — H&P PST ADULT - NSICDXPASTMEDICALHX_GEN_ALL_CORE_FT
PAST MEDICAL HISTORY:  Hypothyroid     Invasive lobular carcinoma of left breast in female     Lumbar disc disorder     Meniscus degeneration, left     Meniscus degeneration, right     Pneumonia 2015

## 2024-09-14 ENCOUNTER — OUTPATIENT (OUTPATIENT)
Dept: OUTPATIENT SERVICES | Facility: HOSPITAL | Age: 74
LOS: 1 days | End: 2024-09-14
Payer: MEDICARE

## 2024-09-14 VITALS
DIASTOLIC BLOOD PRESSURE: 63 MMHG | HEART RATE: 80 BPM | OXYGEN SATURATION: 98 % | RESPIRATION RATE: 15 BRPM | SYSTOLIC BLOOD PRESSURE: 106 MMHG | TEMPERATURE: 97 F

## 2024-09-14 VITALS
TEMPERATURE: 98 F | OXYGEN SATURATION: 95 % | RESPIRATION RATE: 19 BRPM | HEART RATE: 89 BPM | DIASTOLIC BLOOD PRESSURE: 77 MMHG | SYSTOLIC BLOOD PRESSURE: 120 MMHG | WEIGHT: 189.16 LBS | HEIGHT: 65 IN

## 2024-09-14 DIAGNOSIS — Z98.890 OTHER SPECIFIED POSTPROCEDURAL STATES: Chronic | ICD-10-CM

## 2024-09-14 DIAGNOSIS — S52.122A DISPLACED FRACTURE OF HEAD OF LEFT RADIUS, INITIAL ENCOUNTER FOR CLOSED FRACTURE: ICD-10-CM

## 2024-09-14 DIAGNOSIS — R89.7 ABNORMAL HISTOLOGICAL FINDINGS IN SPECIMENS FROM OTHER ORGANS, SYSTEMS AND TISSUES: Chronic | ICD-10-CM

## 2024-09-14 DEVICE — IMPLANTABLE DEVICE: Type: IMPLANTABLE DEVICE | Site: LEFT | Status: FUNCTIONAL

## 2024-09-14 RX ORDER — HYDROMORPHONE HYDROCHLORIDE 2 MG/1
0.5 TABLET ORAL
Refills: 0 | Status: DISCONTINUED | OUTPATIENT
Start: 2024-09-14 | End: 2024-09-14

## 2024-09-14 RX ORDER — TRAMADOL HYDROCHLORIDE 200 MG/1
1 TABLET, EXTENDED RELEASE ORAL
Refills: 0 | DISCHARGE

## 2024-09-14 RX ORDER — CEFAZOLIN SODIUM 2 G/100ML
2000 INJECTION, SOLUTION INTRAVENOUS ONCE
Refills: 0 | Status: COMPLETED | OUTPATIENT
Start: 2024-09-14 | End: 2024-09-14

## 2024-09-14 RX ORDER — SERTRALINE HYDROCHLORIDE 50 MG/1
1 TABLET, FILM COATED ORAL
Refills: 0 | DISCHARGE

## 2024-09-14 RX ORDER — ANASTROZOLE 1 MG/1
1 TABLET ORAL
Refills: 0 | DISCHARGE

## 2024-09-14 RX ORDER — OXYCODONE HYDROCHLORIDE 5 MG/1
1 TABLET ORAL
Qty: 28 | Refills: 0
Start: 2024-09-14 | End: 2024-09-20

## 2024-09-14 RX ORDER — SODIUM CHLORIDE 9 MG/ML
3 INJECTION INTRAMUSCULAR; INTRAVENOUS; SUBCUTANEOUS EVERY 8 HOURS
Refills: 0 | Status: DISCONTINUED | OUTPATIENT
Start: 2024-09-14 | End: 2024-09-14

## 2024-09-14 RX ORDER — FLU VACCINE TS 2012-2013(5YR+) 45MCG/.5ML
0.5 VIAL (ML) INTRAMUSCULAR ONCE
Refills: 0 | Status: ACTIVE | OUTPATIENT
Start: 2024-09-14 | End: 2025-08-13

## 2024-09-14 RX ORDER — LIDOCAINE HCL 20 MG/ML
0.2 VIAL (ML) INJECTION ONCE
Refills: 0 | Status: DISCONTINUED | OUTPATIENT
Start: 2024-09-14 | End: 2024-09-14

## 2024-09-14 RX ORDER — CHLORHEXIDINE GLUCONATE 40 MG/ML
1 SOLUTION TOPICAL ONCE
Refills: 0 | Status: DISCONTINUED | OUTPATIENT
Start: 2024-09-14 | End: 2024-09-14

## 2024-09-14 RX ORDER — LORAZEPAM 4 MG/ML
1 INJECTION INTRAMUSCULAR; INTRAVENOUS
Refills: 0 | DISCHARGE

## 2024-09-14 RX ORDER — LEVOTHYROXINE SODIUM 100 MCG
1 TABLET ORAL
Refills: 0 | DISCHARGE

## 2024-09-14 RX ADMIN — HYDROMORPHONE HYDROCHLORIDE 0.5 MILLIGRAM(S): 2 TABLET ORAL at 14:30

## 2024-09-14 NOTE — ASU DISCHARGE PLAN (ADULT/PEDIATRIC) - CARE PROVIDER_API CALL
Antonette Castillo  Orthopaedic Surgery  [address] [place] [address], [address]  [place] [address]  Phone: [phone]  Fax: [phone]  Follow Up Time:

## 2024-09-14 NOTE — BRIEF OPERATIVE NOTE - NSICDXBRIEFPROCEDURE_GEN_ALL_CORE_FT
PROCEDURES:  Arthroplasty, radial head 14-Sep-2024 10:32:08 Left radial head arthroplasty, [employer] Manuel Son

## 2024-09-14 NOTE — ASU DISCHARGE PLAN (ADULT/PEDIATRIC) - ASU DC SPECIAL INSTRUCTIONSFT
1. Pain medication has been sent to your pharmacy - pick it up on the way home and use as needed. Use Motrin and Tylenol as well for pain.   2. No weight bearing or leaning on the left upper extremity. It is ok to come out of the sling, and you should begin ranging the elbow tomorrow (9/15/24).  3. Follow up with Dr. Castillo a week from Monday, on 9/23/24.  4. Keep the dressing clean and dry.   5. Keep the extremity elevated and iced.

## 2024-09-14 NOTE — PATIENT PROFILE ADULT - FALL HARM RISK - HARM RISK INTERVENTIONS

## 2024-09-15 PROCEDURE — 88342 IMHCHEM/IMCYTCHM 1ST ANTB: CPT

## 2024-09-15 PROCEDURE — 88305 TISSUE EXAM BY PATHOLOGIST: CPT | Mod: 26

## 2024-09-15 PROCEDURE — 88342 IMHCHEM/IMCYTCHM 1ST ANTB: CPT | Mod: 26

## 2024-09-15 PROCEDURE — 88311 DECALCIFY TISSUE: CPT | Mod: 26

## 2024-09-15 PROCEDURE — 88305 TISSUE EXAM BY PATHOLOGIST: CPT

## 2024-09-15 PROCEDURE — 24343 REPR ELBOW LAT LIGMNT W/TISS: CPT | Mod: LT

## 2024-09-15 PROCEDURE — 76000 FLUOROSCOPY <1 HR PHYS/QHP: CPT

## 2024-09-15 PROCEDURE — 88311 DECALCIFY TISSUE: CPT

## 2024-09-15 PROCEDURE — 24666 OPTX RADIAL HEAD/NCK FX RPLC: CPT | Mod: LT

## 2024-09-15 PROCEDURE — C9399: CPT

## 2024-09-15 PROCEDURE — C1776: CPT

## 2024-09-30 LAB — SURGICAL PATHOLOGY STUDY: SIGNIFICANT CHANGE UP

## 2024-10-30 ENCOUNTER — APPOINTMENT (OUTPATIENT)
Dept: MAMMOGRAPHY | Facility: CLINIC | Age: 74
End: 2024-10-30
Payer: MEDICARE

## 2024-10-30 ENCOUNTER — OUTPATIENT (OUTPATIENT)
Dept: OUTPATIENT SERVICES | Facility: HOSPITAL | Age: 74
LOS: 1 days | End: 2024-10-30
Payer: MEDICARE

## 2024-10-30 ENCOUNTER — RESULT REVIEW (OUTPATIENT)
Age: 74
End: 2024-10-30

## 2024-10-30 ENCOUNTER — APPOINTMENT (OUTPATIENT)
Dept: ULTRASOUND IMAGING | Facility: CLINIC | Age: 74
End: 2024-10-30
Payer: MEDICARE

## 2024-10-30 DIAGNOSIS — Z98.890 OTHER SPECIFIED POSTPROCEDURAL STATES: Chronic | ICD-10-CM

## 2024-10-30 DIAGNOSIS — R89.7 ABNORMAL HISTOLOGICAL FINDINGS IN SPECIMENS FROM OTHER ORGANS, SYSTEMS AND TISSUES: Chronic | ICD-10-CM

## 2024-10-30 DIAGNOSIS — C50.919 MALIGNANT NEOPLASM OF UNSPECIFIED SITE OF UNSPECIFIED FEMALE BREAST: ICD-10-CM

## 2024-10-30 PROCEDURE — 77067 SCR MAMMO BI INCL CAD: CPT | Mod: 26

## 2024-10-30 PROCEDURE — 76641 ULTRASOUND BREAST COMPLETE: CPT | Mod: 26,50,GA

## 2024-10-30 PROCEDURE — 77063 BREAST TOMOSYNTHESIS BI: CPT | Mod: 26

## 2024-11-07 ENCOUNTER — NON-APPOINTMENT (OUTPATIENT)
Age: 74
End: 2024-11-07

## 2024-11-07 ENCOUNTER — APPOINTMENT (OUTPATIENT)
Dept: SURGICAL ONCOLOGY | Facility: CLINIC | Age: 74
End: 2024-11-07
Payer: MEDICARE

## 2024-11-07 VITALS
WEIGHT: 188 LBS | SYSTOLIC BLOOD PRESSURE: 128 MMHG | BODY MASS INDEX: 31.32 KG/M2 | DIASTOLIC BLOOD PRESSURE: 79 MMHG | RESPIRATION RATE: 17 BRPM | OXYGEN SATURATION: 95 % | HEIGHT: 65 IN | HEART RATE: 93 BPM

## 2024-11-07 DIAGNOSIS — C50.919 MALIGNANT NEOPLASM OF UNSPECIFIED SITE OF UNSPECIFIED FEMALE BREAST: ICD-10-CM

## 2024-11-07 PROCEDURE — 99214 OFFICE O/P EST MOD 30 MIN: CPT

## 2024-11-20 PROCEDURE — 77067 SCR MAMMO BI INCL CAD: CPT

## 2024-11-20 PROCEDURE — 76641 ULTRASOUND BREAST COMPLETE: CPT

## 2024-11-20 PROCEDURE — 77063 BREAST TOMOSYNTHESIS BI: CPT

## 2024-11-21 NOTE — HISTORY OF PRESENT ILLNESS
4 BOXES  2 - [id number] EXP 10/31/26  2- [id number] EXP 5/31/27  
[de-identified] : This is a 68 year old post menopausal female presents for a 3 month breast cancer follow up visit. She is s/p left ASBINE  lumpectomy and left axillary sentinel lymph node biopsy on 18.  Final pathology revealed an 8 mm invasive lobular carcinoma, ER/VT(+) HER2(-), negative margins, negative lymph nodes. Tumor stage: pT1b pN0. \par \par Oncotype Dx=15\par \par She completed XRT under the care of Dr. Yara Palencia.  She is currently on Arimidex 1 mg daily. \par \par Labs Aug 2019:  CEA (<0.6 ng/mL); CA27.29 (19.5 U/mL); LFT's WNL\par \par B/L mammo/sono 2019 was without evidence of malignancy (Birads 2). \par \par Denies palpable breast masses, nipple discharge, skin dimpling or inversion.  Has c/o occasional left breast aching and left breast heaviness.  \par \par \par PERTINENT HISTORY:\par Initially consulted 18 for newly diagnosed left breast cancer. She was referred to our office by Dr. Waldemar Carlos (OB-GYN). \par \par She underwent a screening mammogram on 18 which revealed asymmetries in the upper outer quadrant, middle third depth of the LEFT breast. No dominant mass, suspicious grouping of calcifications or other sign of malignancy was identified. BIRADS 0\par \par Diagnostic left mammogram/left ultrasound 18 showed focal asymmetry in the upper outer left breast with associated architectural distortion measuring approximately 9-10 mm.  10 mm hypoechoic mass in the left breast 2:00 axis with mammographic correlate. US guided biopsy is recommended. BIRADS 4C. \par \par \par Pathology:\par -Invasive lobular carcinoma, classic type\par -Sukhdev score 5/9\par -Lymphovascular permeation by tumor not seen\par -ER(+), VT(+), HER2(-)\par \par Denies palpable breast masses, nipple discharge, skin dimpling, inversion or breast pain\par Family history of breast cancer involves her paternal aunt at age 50 and maternal cousin @ age 61\par Denies family history of ovarian cancer\par Denies prior breast biopsies\par Denies past or current use of exogenous HRT\par \par Menarche age: 15\par LMP: \par    (age at first pregnancy 26)\par \par PMH notable for hypothyroidism (on Synthroid), left thyroid nodule, post infectious neuritis, liver lesion?, left kidney cyst, left ovarian cyst, 3 vaginal births, spinal surgery , uterine polyp  s/p D&C, spine surgery , right knee meniscus repair , left knee meniscus repair . Former smoker (quit ). Social alcohol use. \par \par Referring physician/OB-GYN: Dr. Waldemar Villarreal\par Internist: Dr. Manda Chacon\par

## 2025-04-15 NOTE — PATIENT PROFILE ADULT - PATIENT'S PREFERRED PRONOUN
Her/She Patient wondering if he can go on cruise August 1st if he has surgery before end of May.  Raina Ferris, LPN  You3 minutes ago (12:48 PM)     SN  There is no way to know for sure that soon after surgery if he will be able to, usually we would say he could resume normal activities 3 months after surgery     [phone]   Informed patient of above.

## 2025-05-07 ENCOUNTER — NON-APPOINTMENT (OUTPATIENT)
Age: 75
End: 2025-05-07

## 2025-05-15 ENCOUNTER — APPOINTMENT (OUTPATIENT)
Dept: SURGICAL ONCOLOGY | Facility: CLINIC | Age: 75
End: 2025-05-15
Payer: COMMERCIAL

## 2025-05-15 VITALS
HEART RATE: 94 BPM | HEIGHT: 65 IN | OXYGEN SATURATION: 95 % | SYSTOLIC BLOOD PRESSURE: 134 MMHG | BODY MASS INDEX: 30.66 KG/M2 | DIASTOLIC BLOOD PRESSURE: 91 MMHG | WEIGHT: 184 LBS | RESPIRATION RATE: 18 BRPM

## 2025-05-15 DIAGNOSIS — C50.919 MALIGNANT NEOPLASM OF UNSPECIFIED SITE OF UNSPECIFIED FEMALE BREAST: ICD-10-CM

## 2025-05-15 PROCEDURE — 99214 OFFICE O/P EST MOD 30 MIN: CPT

## 2025-07-23 NOTE — ASU PREOP CHECKLIST - TYPE OF SOLUTION
"Endoscopy Scheduling Screen    Caller: patient    Have you had any respiratory illness or flu-like symptoms in the last 10 days?  No    Patient is ACTIVE on [employer].  Inform patient that all appointment instructions will be sent via [employer].    Review patient's insurance for any non participating payor.    Ordering/Referring Provider: Ron Morgan MD     (If ordering provider performs procedure, schedule with ordering provider unless otherwise instructed. )    BMI: Estimated body mass index is 18.66 kg/m  as calculated from the following:    Height as of 6/18/25: 1.575 m (5' 2\").    Weight as of 6/18/25: 46.3 kg (102 lb).     Sedation Ordered  MAC/deep sedation.   BMI<= 45 45 < BMI <= 48 48 < BMI < = 50  BMI > 50   No Restrictions No [facility]  No [facility]  [facility] with exceptions Hospital Only OR Only       Do you have a history of malignant hyperthermia?  No    (Females) Are you currently pregnant?   No     Have you been diagnosed or told you have pulmonary hypertension?   No    Do you have an LVAD?  No    Have you been told you have moderate to severe sleep apnea?  No.    Have you been told you have COPD, asthma, or any other lung disease?  No    Has your doctor ordered any cardiac tests like echo, angiogram, stress test, ablation, or EKG, that you have not completed yet?  No    Do you  have a history of any heart conditions?  No     Have you ever had or are you waiting for an organ transplant?  No. Continue scheduling, no site restrictions.    Have you had a stroke or transient ischemic attack (TIA aka \"mini stroke\") in the last 2 years?   No.    Have you been diagnosed with or been told you have cirrhosis of the liver?   No.    Are you currently on dialysis?   No    Do you need assistance transferring?   No    BMI: Estimated body mass index is 18.66 kg/m  as calculated from the following:    Height as of 6/18/25: 1.575 m (5' 2\").    Weight as of 6/18/25: 46.3 kg (102 lb).     Is patients BMI > 40 " and scheduling location [facility]?  No    Do you take an injectable or oral medication for weight loss or diabetes (excluding insulin)?  No    Do you take the medication Naltrexone?  No    Do you take blood thinners?  No       Prep   Are you currently on dialysis or do you have chronic kidney disease?  No    Do you have a diagnosis of diabetes?  Yes (Golytely Prep)    Do you have a diagnosis of cystic fibrosis (CF)?  No    On a regular basis do you go 3 -5 days between bowel movements?  Yes (Extended Prep)    BMI > 40?  No    Preferred Pharmacy:    [id number] [place], [place] - [address] [facility]  [address] [facility] [address]  Phone: [phone] Fax: [phone]        Final Scheduling Details     Procedure scheduled  Colonoscopy    Surgeon:  sangeetha     Date of procedure:  9/15     Pre-OP / PAC:   Yes - Patient informed of pre-op requirement.    Location  [facility] - Patient preference.    Sedation   MAC/Deep Sedation - Per order.      Patient Reminders:   You will receive a call from a Nurse to review instructions and health history.  This assessment must be completed prior to your procedure.  Failure to complete the Nurse assessment may result in the procedure being cancelled.      On the day of your procedure, please designate an adult(s) who can drive you home stay with you for the next 24 hours. The medicines used in the exam will make you sleepy. You will not be able to drive.      You cannot take public transportation, ride share services, or non-medical taxi service without a responsible caregiver.  Medical transport services are allowed with the requirement that a responsible caregiver will receive you at your destination.  We require that drivers and caregivers are confirmed prior to your procedure.   heplock

## 2025-08-22 ENCOUNTER — APPOINTMENT (OUTPATIENT)
Dept: MRI IMAGING | Facility: CLINIC | Age: 75
End: 2025-08-22

## 2025-08-22 ENCOUNTER — OUTPATIENT (OUTPATIENT)
Dept: OUTPATIENT SERVICES | Facility: HOSPITAL | Age: 75
LOS: 1 days | End: 2025-08-22
Payer: MEDICARE

## 2025-08-22 DIAGNOSIS — Z00.00 ENCOUNTER FOR GENERAL ADULT MEDICAL EXAMINATION WITHOUT ABNORMAL FINDINGS: ICD-10-CM

## 2025-08-22 DIAGNOSIS — Z98.890 OTHER SPECIFIED POSTPROCEDURAL STATES: Chronic | ICD-10-CM

## 2025-08-22 DIAGNOSIS — R89.7 ABNORMAL HISTOLOGICAL FINDINGS IN SPECIMENS FROM OTHER ORGANS, SYSTEMS AND TISSUES: Chronic | ICD-10-CM

## 2025-08-22 PROCEDURE — 73718 MRI LOWER EXTREMITY W/O DYE: CPT | Mod: MH

## 2025-08-22 PROCEDURE — 73718 MRI LOWER EXTREMITY W/O DYE: CPT | Mod: 26,RT

## (undated) DEVICE — BIPOLAR FORCEP SYMMETRY BAYONET 7" X 1.5MM SMOOTH (SILVER)

## (undated) DEVICE — DRSG COMBINE 5X9"

## (undated) DEVICE — PREP BETADINE KIT

## (undated) DEVICE — POSITIONER FOAM EGG CRATE ULNAR 2PCS (PINK)

## (undated) DEVICE — DRSG DERMABOND 0.7ML

## (undated) DEVICE — CONN 5 IN 1 FOR SUCTION TUBING

## (undated) DEVICE — DRAPE SPLIT SHEET 77" X 108"

## (undated) DEVICE — SOL IRR POUR NS 0.9% 500ML

## (undated) DEVICE — SUT POLYSORB 3-0 30" P-12 UNDYED

## (undated) DEVICE — SUT BIOSYN 4-0 18" P-13

## (undated) DEVICE — SUT SURGIPRO 3-0 18" P-12

## (undated) DEVICE — POSITIONER BOOT CRADLE

## (undated) DEVICE — ELCTR BOVIE PENCIL SMOKE EVACUATION

## (undated) DEVICE — Device

## (undated) DEVICE — TOURNIQUET CUFF 18" DUAL PORT SINGLE BLADDER LUER LOCK (BLACK)

## (undated) DEVICE — DRSG STERISTRIPS 0.5 X 4"

## (undated) DEVICE — VENODYNE/SCD SLEEVE CALF MEDIUM

## (undated) DEVICE — BLADE SURGICAL #15 CARBON

## (undated) DEVICE — STAPLER SKIN VISI-STAT 35 WIDE

## (undated) DEVICE — DRAPE MAYO STAND 30"

## (undated) DEVICE — DRAPE C ARM UNIVERSAL

## (undated) DEVICE — DRSG WEBRIL 4"

## (undated) DEVICE — ELCTR BIPOLAR CORD J&J 12FT DISP

## (undated) DEVICE — GLV 9 DERMAPRENE ULTRA

## (undated) DEVICE — DRSG STOCKINETTE IMPERVIOUS MED

## (undated) DEVICE — DRSG ACE BANDAGE 4" NS

## (undated) DEVICE — SLING SHOULDER IMMOBILIZER CLINIC LARGE

## (undated) DEVICE — POSITIONER BLUE BOLSTER

## (undated) DEVICE — WARMING BLANKET LOWER ADULT

## (undated) DEVICE — DRAPE 3/4 SHEET W REINFORCEMENT 56X77"

## (undated) DEVICE — DRSG CURITY GAUZE SPONGE 4 X 4" 12-PLY

## (undated) DEVICE — GOWN TRIMAX LG

## (undated) DEVICE — POSITIONER FOAM HEADREST (PINK)

## (undated) DEVICE — GLV 9 PROTEXIS (WHITE)

## (undated) DEVICE — DRSG KLING 6"

## (undated) DEVICE — SOL IRR POUR H2O 250ML

## (undated) DEVICE — DRAPE TOWEL BLUE 17" X 24"

## (undated) DEVICE — TAPE SILK 3"

## (undated) DEVICE — PACK EXTREMITY

## (undated) DEVICE — SUT POLYSORB 4-0 18" P-13 UNDYED